# Patient Record
Sex: FEMALE | Race: WHITE | Employment: FULL TIME | URBAN - METROPOLITAN AREA
[De-identification: names, ages, dates, MRNs, and addresses within clinical notes are randomized per-mention and may not be internally consistent; named-entity substitution may affect disease eponyms.]

---

## 2017-01-03 ENCOUNTER — PATIENT MESSAGE (OUTPATIENT)
Dept: FAMILY MEDICINE CLINIC | Facility: CLINIC | Age: 57
End: 2017-01-03

## 2017-01-05 NOTE — TELEPHONE ENCOUNTER
From: Kayley Owens  To: Laura Cadet DO  Sent: 1/3/2017 1:10 PM CST  Subject: Other    I received a notice in the mail about labs test that were ordered for me but I was under the impression that I was waiting to see the doctor on the 23rd of Gennette Folds

## 2017-01-11 ENCOUNTER — HOSPITAL ENCOUNTER (EMERGENCY)
Facility: HOSPITAL | Age: 57
Discharge: STILL ADMITTED OR ADMINISTRATIVE | End: 2017-01-11
Payer: COMMERCIAL

## 2017-01-11 ENCOUNTER — HOSPITAL ENCOUNTER (EMERGENCY)
Facility: HOSPITAL | Age: 57
Discharge: HOME | End: 2017-01-11
Attending: EMERGENCY MEDICINE
Payer: COMMERCIAL

## 2017-01-11 ENCOUNTER — OFFICE VISIT (OUTPATIENT)
Dept: SURGERY | Facility: CLINIC | Age: 57
End: 2017-01-11

## 2017-01-11 VITALS
DIASTOLIC BLOOD PRESSURE: 91 MMHG | HEART RATE: 76 BPM | SYSTOLIC BLOOD PRESSURE: 136 MMHG | TEMPERATURE: 98 F | WEIGHT: 135 LBS | RESPIRATION RATE: 18 BRPM | BODY MASS INDEX: 22 KG/M2 | OXYGEN SATURATION: 100 %

## 2017-01-11 DIAGNOSIS — S01.551A DOG BITE OF SKIN OF LIP, INITIAL ENCOUNTER: Primary | ICD-10-CM
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
## 2017-01-11 DIAGNOSIS — S01.85XA DOG BITE OF FACE, INITIAL ENCOUNTER: Primary | ICD-10-CM
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
## 2017-01-11 PROCEDURE — 99283 EMERGENCY DEPT VISIT LOW MDM: CPT

## 2017-01-11 PROCEDURE — 90471 IMMUNIZATION ADMIN: CPT

## 2017-01-11 PROCEDURE — 12052 INTMD RPR FACE/MM 2.6-5.0 CM: CPT | Performed by: SURGERY

## 2017-01-11 RX ORDER — AMOXICILLIN AND CLAVULANATE POTASSIUM 875; 125 MG/1; MG/1
1 TABLET, FILM COATED ORAL 2 TIMES DAILY
Qty: 14 TABLET | Refills: 0 | Status: SHIPPED | OUTPATIENT
Start: 2017-01-11 | End: 2017-01-11 | Stop reason: ALTCHOICE

## 2017-01-11 NOTE — ED NOTES
Pt and spouse verbalized understanding of need to return to ED at 1530. At that time, pt will need to be triaged again and pt is aware staff will page Dr. Timoteo Payne at that time in order for pt to be seen by him somewhere in the ED.  Pt's spouse voiced concern a

## 2017-01-11 NOTE — ED PROVIDER NOTES
Patient Seen in: [facility] Emergency Department    History   Patient presents with:  Laceration Abrasion (integumentary)  Bite (integumentary)    Stated Complaint: dog bite to the face/ mouth.     HPI    73-year-old female complaining of dog bite th dry and moist red mucosa and is through and through through the orbicularis oris and deep into the oral mucosa. This appears to be a through and through laceration and is quite irregular with jagged edges. There is no active bleeding.     ED Course   Labs

## 2017-01-11 NOTE — ED NOTES
Discussed with pt and family that pt is to return to the ED at 3:30 for follow up with Dr. Aminta Bryant and instruct staff that Dr. Aminta Bryant will need to be paged at that time to see pt in Ed. Charge SILVESTRE Baugh aware of plan and need for arrangements.

## 2017-01-11 NOTE — ED NOTES
Pt not yet ready for discharge. Pt in need of irrigation of wound and placement of bandaid. Discussed with Dr. Jun Acevedo, pt to follow up at 4pm with plastics.

## 2017-01-18 ENCOUNTER — OFFICE VISIT (OUTPATIENT)
Dept: SURGERY | Facility: CLINIC | Age: 57
End: 2017-01-18

## 2017-01-18 VITALS
HEART RATE: 62 BPM | WEIGHT: 139.63 LBS | SYSTOLIC BLOOD PRESSURE: 113 MMHG | BODY MASS INDEX: 22.44 KG/M2 | DIASTOLIC BLOOD PRESSURE: 74 MMHG | HEIGHT: 66 IN | TEMPERATURE: 98 F
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
## 2017-01-18 DIAGNOSIS — S01.551D DOG BITE OF SKIN OF LIP, SUBSEQUENT ENCOUNTER: Primary | ICD-10-CM

## 2017-01-18 PROCEDURE — 99024 POSTOP FOLLOW-UP VISIT: CPT | Performed by: SURGERY

## 2017-01-18 NOTE — PROGRESS NOTES
Isidra Portillo is a 49year old female who presents today for a follow-up after complex lip repair right upper lip after dog bite 1 week ago. She is here today for evaluation and suture removal.      She denies fever and chills.  She denies nausea,

## 2017-01-23 ENCOUNTER — OFFICE VISIT (OUTPATIENT)
Dept: FAMILY MEDICINE CLINIC | Facility: CLINIC | Age: 57
End: 2017-01-23

## 2017-01-23 VITALS
HEIGHT: 66 IN | DIASTOLIC BLOOD PRESSURE: 78 MMHG | BODY MASS INDEX: 22.82 KG/M2 | SYSTOLIC BLOOD PRESSURE: 122 MMHG | TEMPERATURE: 99 F | HEART RATE: 68 BPM | RESPIRATION RATE: 20 BRPM | WEIGHT: 142 LBS

## 2017-01-23 DIAGNOSIS — D64.9 ANEMIA, UNSPECIFIED TYPE: ICD-10-CM

## 2017-01-23 DIAGNOSIS — L60.9 NAIL ABNORMALITY: ICD-10-CM

## 2017-01-23 DIAGNOSIS — M43.6 NECK STIFFNESS: ICD-10-CM

## 2017-01-23 DIAGNOSIS — Z12.31 ENCOUNTER FOR SCREENING MAMMOGRAM FOR HIGH-RISK PATIENT: ICD-10-CM

## 2017-01-23 DIAGNOSIS — Z13.820 SCREENING FOR OSTEOPOROSIS: ICD-10-CM

## 2017-01-23 DIAGNOSIS — M72.2 PLANTAR FASCIITIS, RIGHT: ICD-10-CM

## 2017-01-23 DIAGNOSIS — Z01.419 WELL WOMAN EXAM WITH ROUTINE GYNECOLOGICAL EXAM: Primary | ICD-10-CM

## 2017-01-23 PROCEDURE — 99396 PREV VISIT EST AGE 40-64: CPT | Performed by: FAMILY MEDICINE

## 2017-01-23 NOTE — PROGRESS NOTES
HPI:   Misti Allred is a 49year old female who presents for a complete physical exam.     Wt Readings from Last 6 Encounters:  01/23/17 : 142 lb  01/18/17 : 139 lb 9.6 oz  01/11/17 : 135 lb  11/02/15 : 145 lb  06/09/15 : 140 lb  10/20/14 : 142 lb GENERAL: feels well otherwise  SKIN: denies any unusual skin lesions  EYES:denies blurred vision or double vision  HEENT: denies nasal congestion, sinus pain or ST  LUNGS: denies shortness of breath with exertion  CARDIOVASCULAR: denies chest pain on exe type      5. Nail abnormality    - DERM - INTERNAL    6. Neck stiffness    - XR CERVICAL SPINE (4VIEWS) (CPT=51062); Future    7.  Plantar fasciitis, right  Monitor / shoe inserts         Discussed diet, exercise,calcium, vitamin D, fish oil and self breast

## 2017-01-27 ENCOUNTER — HOSPITAL ENCOUNTER (OUTPATIENT)
Dept: GENERAL RADIOLOGY | Facility: HOSPITAL | Age: 57
Discharge: HOME | End: 2017-01-27
Attending: FAMILY MEDICINE
Payer: COMMERCIAL

## 2017-01-27 DIAGNOSIS — M43.6 NECK STIFFNESS: ICD-10-CM

## 2017-01-27 PROCEDURE — 72050 X-RAY EXAM NECK SPINE 4/5VWS: CPT

## 2017-01-30 ENCOUNTER — APPOINTMENT (OUTPATIENT)
Dept: LAB | Age: 57
End: 2017-01-30
Attending: DERMATOLOGY
Payer: COMMERCIAL

## 2017-01-30 PROCEDURE — 87186 SC STD MICRODIL/AGAR DIL: CPT | Performed by: DERMATOLOGY

## 2017-01-30 PROCEDURE — 87077 CULTURE AEROBIC IDENTIFY: CPT | Performed by: DERMATOLOGY

## 2017-01-30 PROCEDURE — 87070 CULTURE OTHR SPECIMN AEROBIC: CPT | Performed by: DERMATOLOGY

## 2017-01-30 PROCEDURE — 87205 SMEAR GRAM STAIN: CPT | Performed by: DERMATOLOGY

## 2017-02-18 ENCOUNTER — HOSPITAL ENCOUNTER (OUTPATIENT)
Dept: MAMMOGRAPHY | Age: 57
Discharge: HOME | End: 2017-02-18
Attending: FAMILY MEDICINE
Payer: COMMERCIAL

## 2017-02-18 ENCOUNTER — HOSPITAL ENCOUNTER (OUTPATIENT)
Dept: BONE DENSITY | Age: 57
Discharge: HOME | End: 2017-02-18
Attending: FAMILY MEDICINE
Payer: COMMERCIAL

## 2017-02-18 DIAGNOSIS — Z13.820 SCREENING FOR OSTEOPOROSIS: ICD-10-CM

## 2017-02-18 DIAGNOSIS — Z12.31 ENCOUNTER FOR SCREENING MAMMOGRAM FOR HIGH-RISK PATIENT: ICD-10-CM

## 2017-02-18 PROCEDURE — 77063 BREAST TOMOSYNTHESIS BI: CPT

## 2017-02-18 PROCEDURE — 77067 SCR MAMMO BI INCL CAD: CPT

## 2017-02-18 PROCEDURE — 77080 DXA BONE DENSITY AXIAL: CPT

## 2017-03-06 ENCOUNTER — PATIENT MESSAGE (OUTPATIENT)
Dept: FAMILY MEDICINE CLINIC | Facility: CLINIC | Age: 57
End: 2017-03-06

## 2017-03-06 NOTE — TELEPHONE ENCOUNTER
From: Scar Espino  To: Qi Small DO  Sent: 3/6/2017 9:05 AM CST  Subject: Non-Urgent Medical Question    I had my flu shot at [facility] back in October. Could you please update my chart? Thanks. Have a great day.     Quique Khalil

## 2018-02-11 NOTE — PROGRESS NOTES
HPI:   Miguel Holt is a 56year old female who presents for a complete physical exam.     Wt Readings from Last 6 Encounters:  02/12/18 : 146 lb  01/23/17 : 142 lb  01/18/17 : 139 lb 9.6 oz  01/11/17 : 135 lb  11/02/15 : 145 lb  06/09/15 : 140 lb Smokeless tobacco: Never Used                      Alcohol use: No              Occ: . : . Children: . Works in perinatology for [employer]  Exercise: 7 times per week.   Diet: watches calories closely     REVIEW OF SYSTEMS:   GENERAL: feels well othe T4 (FREE THYROXINE); Future  - ASSAY, THYROID STIM HORMONE; Future  - LIPID PANEL; Future  - CBC WITH DIFFERENTIAL WITH PLATELET; Future  - VITAMIN B12; Future  - VITAMIN D, 25-HYDROXY; Future  - COMP METABOLIC PANEL (14); Future  - IRON AND TIBC;  Future

## 2018-02-12 ENCOUNTER — OFFICE VISIT (OUTPATIENT)
Dept: FAMILY MEDICINE CLINIC | Facility: CLINIC | Age: 58
End: 2018-02-12

## 2018-02-12 VITALS
DIASTOLIC BLOOD PRESSURE: 80 MMHG | TEMPERATURE: 99 F | WEIGHT: 146 LBS | OXYGEN SATURATION: 99 % | HEART RATE: 72 BPM | BODY MASS INDEX: 23.46 KG/M2 | HEIGHT: 66 IN | RESPIRATION RATE: 20 BRPM | SYSTOLIC BLOOD PRESSURE: 120 MMHG

## 2018-02-12 DIAGNOSIS — Z12.31 ENCOUNTER FOR SCREENING MAMMOGRAM FOR HIGH-RISK PATIENT: ICD-10-CM

## 2018-02-12 DIAGNOSIS — Z01.419 WELL WOMAN EXAM WITH ROUTINE GYNECOLOGICAL EXAM: Primary | ICD-10-CM

## 2018-02-12 DIAGNOSIS — Z00.00 ANNUAL PHYSICAL EXAM: ICD-10-CM

## 2018-02-12 PROBLEM — S01.551A DOG BITE OF SKIN OF LIP: Status: RESOLVED | Noted: 2017-01-11 | Resolved: 2018-02-12
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
## 2018-02-12 PROCEDURE — 87624 HPV HI-RISK TYP POOLED RSLT: CPT | Performed by: FAMILY MEDICINE

## 2018-02-12 PROCEDURE — 88175 CYTOPATH C/V AUTO FLUID REDO: CPT | Performed by: FAMILY MEDICINE

## 2018-02-12 PROCEDURE — 99396 PREV VISIT EST AGE 40-64: CPT | Performed by: FAMILY MEDICINE

## 2018-02-13 LAB — HPV I/H RISK 1 DNA SPEC QL NAA+PROBE: NEGATIVE

## 2018-02-14 LAB
LAST PAP RESULT: NORMAL
PAP HISTORY (OTHER THAN LAST PAP): NORMAL

## 2018-02-19 ENCOUNTER — LAB ENCOUNTER (OUTPATIENT)
Dept: LAB | Facility: HOSPITAL | Age: 58
End: 2018-02-19
Attending: FAMILY MEDICINE
Payer: COMMERCIAL

## 2018-02-19 DIAGNOSIS — Z00.00 ANNUAL PHYSICAL EXAM: ICD-10-CM

## 2018-02-19 LAB
25-HYDROXYVITAMIN D (TOTAL): 56.7 NG/ML (ref 30–100)
ALBUMIN SERPL-MCNC: 4.1 G/DL (ref 3.5–4.8)
ALP LIVER SERPL-CCNC: 86 U/L (ref 41–108)
ALT SERPL-CCNC: 25 U/L (ref 14–54)
AST SERPL-CCNC: 29 U/L (ref 15–41)
BASOPHILS # BLD AUTO: 0.04 X10(3) UL (ref 0–0.1)
BASOPHILS NFR BLD AUTO: 0.8 %
BILIRUB SERPL-MCNC: 0.5 MG/DL (ref 0.1–2)
BUN BLD-MCNC: 15 MG/DL (ref 8–20)
CALCIUM BLD-MCNC: 9.3 MG/DL (ref 8.3–10.3)
CHLORIDE: 106 MMOL/L (ref 101–111)
CHOLEST SMN-MCNC: 214 MG/DL (ref ?–200)
CO2: 29 MMOL/L (ref 22–32)
CREAT BLD-MCNC: 0.67 MG/DL (ref 0.55–1.02)
DEPRECATED HBV CORE AB SER IA-ACNC: 34.8 NG/ML (ref 10–291)
EOSINOPHIL # BLD AUTO: 0.12 X10(3) UL (ref 0–0.3)
EOSINOPHIL NFR BLD AUTO: 2.4 %
ERYTHROCYTE [DISTWIDTH] IN BLOOD BY AUTOMATED COUNT: 11.4 % (ref 11.5–16)
FREE T4: 1 NG/DL (ref 0.9–1.8)
GLUCOSE BLD-MCNC: 96 MG/DL (ref 70–99)
HAV AB SERPL IA-ACNC: 799 PG/ML (ref 193–986)
HCT VFR BLD AUTO: 41.3 % (ref 34–50)
HDLC SERPL-MCNC: 111 MG/DL (ref 45–?)
HDLC SERPL: 1.93 {RATIO} (ref ?–4.44)
HGB BLD-MCNC: 13.9 G/DL (ref 12–16)
IMMATURE GRANULOCYTE COUNT: 0.01 X10(3) UL (ref 0–1)
IMMATURE GRANULOCYTE RATIO %: 0.2 %
IRON SATURATION: 46 % (ref 13–45)
IRON: 175 UG/DL (ref 28–170)
LDLC SERPL CALC-MCNC: 91 MG/DL (ref ?–130)
LYMPHOCYTES # BLD AUTO: 1.59 X10(3) UL (ref 0.9–4)
LYMPHOCYTES NFR BLD AUTO: 31.4 %
M PROTEIN MFR SERPL ELPH: 7.5 G/DL (ref 6.1–8.3)
MCH RBC QN AUTO: 32.3 PG (ref 27–33.2)
MCHC RBC AUTO-ENTMCNC: 33.7 G/DL (ref 31–37)
MCV RBC AUTO: 95.8 FL (ref 81–100)
MONOCYTES # BLD AUTO: 0.51 X10(3) UL (ref 0.1–1)
MONOCYTES NFR BLD AUTO: 10.1 %
NEUTROPHIL ABS PRELIM: 2.8 X10 (3) UL (ref 1.3–6.7)
NEUTROPHILS # BLD AUTO: 2.8 X10(3) UL (ref 1.3–6.7)
NEUTROPHILS NFR BLD AUTO: 55.1 %
NONHDLC SERPL-MCNC: 103 MG/DL (ref ?–130)
PLATELET # BLD AUTO: 194 10(3)UL (ref 150–450)
POTASSIUM SERPL-SCNC: 3.9 MMOL/L (ref 3.6–5.1)
RBC # BLD AUTO: 4.31 X10(6)UL (ref 3.8–5.1)
RED CELL DISTRIBUTION WIDTH-SD: 40.2 FL (ref 35.1–46.3)
SODIUM SERPL-SCNC: 140 MMOL/L (ref 136–144)
TOTAL IRON BINDING CAPACITY: 377 UG/DL (ref 298–536)
TRANSFERRIN: 253 MG/DL (ref 200–360)
TRIGL SERPL-MCNC: 61 MG/DL (ref ?–150)
TSI SER-ACNC: 1.5 MIU/ML (ref 0.35–5.5)
VLDLC SERPL CALC-MCNC: 12 MG/DL (ref 5–40)
WBC # BLD AUTO: 5.1 X10(3) UL (ref 4–13)

## 2018-02-19 PROCEDURE — 83540 ASSAY OF IRON: CPT

## 2018-02-19 PROCEDURE — 82607 VITAMIN B-12: CPT

## 2018-02-19 PROCEDURE — 82728 ASSAY OF FERRITIN: CPT

## 2018-02-19 PROCEDURE — 83550 IRON BINDING TEST: CPT

## 2018-02-19 PROCEDURE — 85025 COMPLETE CBC W/AUTO DIFF WBC: CPT

## 2018-02-19 PROCEDURE — 36415 COLL VENOUS BLD VENIPUNCTURE: CPT

## 2018-02-19 PROCEDURE — 84443 ASSAY THYROID STIM HORMONE: CPT

## 2018-02-19 PROCEDURE — 80061 LIPID PANEL: CPT

## 2018-02-19 PROCEDURE — 82306 VITAMIN D 25 HYDROXY: CPT

## 2018-02-19 PROCEDURE — 80053 COMPREHEN METABOLIC PANEL: CPT

## 2018-02-19 PROCEDURE — 84439 ASSAY OF FREE THYROXINE: CPT

## 2018-02-23 ENCOUNTER — TELEPHONE (OUTPATIENT)
Dept: FAMILY MEDICINE CLINIC | Facility: CLINIC | Age: 58
End: 2018-02-23

## 2018-02-23 DIAGNOSIS — D64.9 ANEMIA, UNSPECIFIED TYPE: Primary | ICD-10-CM

## 2018-02-23 NOTE — TELEPHONE ENCOUNTER
----- Message from Bo Allen DO sent at 2/22/2018  4:30 PM CST -----  Have her take every other day ; repeat cbc and iron studies in 4 mo

## 2018-03-13 ENCOUNTER — HOSPITAL ENCOUNTER (OUTPATIENT)
Dept: MAMMOGRAPHY | Facility: HOSPITAL | Age: 58
Discharge: HOME | End: 2018-03-13
Attending: FAMILY MEDICINE
Payer: COMMERCIAL

## 2018-03-13 DIAGNOSIS — Z12.31 ENCOUNTER FOR SCREENING MAMMOGRAM FOR HIGH-RISK PATIENT: ICD-10-CM

## 2018-03-13 PROCEDURE — 77067 SCR MAMMO BI INCL CAD: CPT | Performed by: FAMILY MEDICINE

## 2018-03-13 PROCEDURE — 77063 BREAST TOMOSYNTHESIS BI: CPT | Performed by: FAMILY MEDICINE

## 2018-03-19 ENCOUNTER — PATIENT MESSAGE (OUTPATIENT)
Dept: FAMILY MEDICINE CLINIC | Facility: CLINIC | Age: 58
End: 2018-03-19

## 2018-03-19 NOTE — TELEPHONE ENCOUNTER
Hi Dr. Raul Jennings,    I had previous gone to Dr. Candace Lizarraga for a bacterial infection under my finger nail.  She prescribed Levofloxacin 500 mg , the infection went away but now has returned.  I was wondering if you could order this medication for me?     María Hancock

## 2018-03-19 NOTE — TELEPHONE ENCOUNTER
From: Julia Pineda  To: Zora Gardiner DO  Sent: 3/19/2018 1:41 PM CDT  Subject: Non-Urgent Erin Clarity Dr. Brunilda Green,    I had previous gone to Dr. Dianna Elena for a bacterial infection under my finger nail.  She prescribed Levofloxacin 5

## 2018-03-20 ENCOUNTER — PATIENT MESSAGE (OUTPATIENT)
Dept: FAMILY MEDICINE CLINIC | Facility: CLINIC | Age: 58
End: 2018-03-20

## 2018-03-20 NOTE — TELEPHONE ENCOUNTER
From: Talisha Benitez  To: Marily Odom DO  Sent: 3/20/2018 2:08 PM CDT  Subject: Non-Urgent Medical Question    Hi Dr. Stacey Vega,    I would like to see the dermatologist Dr. Amie Santizo for the bacterial infection on my nail.   It has come back o

## 2018-03-20 NOTE — TELEPHONE ENCOUNTER
I think it is best she is seen by a provider   Levaquin wound not be my first choice. ..  So an appt is best

## 2018-05-26 ENCOUNTER — HOSPITAL ENCOUNTER (OUTPATIENT)
Age: 58
Discharge: HOME | End: 2018-05-26
Attending: FAMILY MEDICINE
Payer: COMMERCIAL

## 2018-05-26 VITALS
RESPIRATION RATE: 20 BRPM | TEMPERATURE: 98 F | SYSTOLIC BLOOD PRESSURE: 129 MMHG | OXYGEN SATURATION: 100 % | HEART RATE: 62 BPM | DIASTOLIC BLOOD PRESSURE: 78 MMHG

## 2018-05-26 DIAGNOSIS — S05.91XA RIGHT EYE INJURY, INITIAL ENCOUNTER: Primary | ICD-10-CM

## 2018-05-26 PROCEDURE — 99213 OFFICE O/P EST LOW 20 MIN: CPT

## 2018-05-26 PROCEDURE — 99203 OFFICE O/P NEW LOW 30 MIN: CPT

## 2018-05-26 RX ORDER — POLYMYXIN B SULFATE AND TRIMETHOPRIM 1; 10000 MG/ML; [USP'U]/ML
1 SOLUTION OPHTHALMIC
Qty: 10 ML | Refills: 0 | Status: SHIPPED | OUTPATIENT
Start: 2018-05-26 | End: 2018-05-31

## 2018-05-26 RX ORDER — TETRACAINE HYDROCHLORIDE 5 MG/ML
1 SOLUTION OPHTHALMIC ONCE
Status: COMPLETED | OUTPATIENT
Start: 2018-05-26 | End: 2018-05-26

## 2018-05-26 NOTE — ED PROVIDER NOTES
Patient Seen in: [facility]    History   Patient presents with:  Eye Problem    Stated Complaint: RIGHT EYE INJURY    HPI    22-year-old female was working in her garden on Wednesday.   She was unwinding a wire tie and the end of Drops of Tetracaine placed in R eye. After pt comfortable, fluorescein strip placed in eye. Small lesion noted at the right conjunctiva under Woods lamp. Pt tolerated the procedure well. MDM   Questionable contusion versus puncture wound of the eye.   Isaias Pastrana

## 2018-05-26 NOTE — ED INITIAL ASSESSMENT (HPI)
Wed a wire hit pt in outer edge of right eye. States had no pain then or next day. Minimal redness. Now feels bruised when she blinks and redness is more pronounced.   Visual acuity 20/40 right and 20/30 left with glasses

## 2018-06-25 ENCOUNTER — LAB ENCOUNTER (OUTPATIENT)
Dept: LAB | Age: 58
End: 2018-06-25
Attending: FAMILY MEDICINE
Payer: COMMERCIAL

## 2018-06-25 DIAGNOSIS — D64.9 ANEMIA, UNSPECIFIED TYPE: ICD-10-CM

## 2018-06-25 PROCEDURE — 85025 COMPLETE CBC W/AUTO DIFF WBC: CPT

## 2018-06-25 PROCEDURE — 82728 ASSAY OF FERRITIN: CPT

## 2018-06-25 PROCEDURE — 83540 ASSAY OF IRON: CPT

## 2018-06-25 PROCEDURE — 36415 COLL VENOUS BLD VENIPUNCTURE: CPT

## 2018-06-25 PROCEDURE — 83550 IRON BINDING TEST: CPT

## 2019-02-18 ENCOUNTER — OFFICE VISIT (OUTPATIENT)
Dept: FAMILY MEDICINE CLINIC | Facility: CLINIC | Age: 59
End: 2019-02-18
Payer: COMMERCIAL

## 2019-02-18 VITALS
SYSTOLIC BLOOD PRESSURE: 108 MMHG | OXYGEN SATURATION: 98 % | TEMPERATURE: 98 F | HEIGHT: 66 IN | DIASTOLIC BLOOD PRESSURE: 64 MMHG | RESPIRATION RATE: 18 BRPM | WEIGHT: 141.38 LBS | HEART RATE: 61 BPM | BODY MASS INDEX: 22.72 KG/M2

## 2019-02-18 DIAGNOSIS — Z01.419 WELL WOMAN EXAM WITH ROUTINE GYNECOLOGICAL EXAM: Primary | ICD-10-CM

## 2019-02-18 DIAGNOSIS — Z12.31 ENCOUNTER FOR SCREENING MAMMOGRAM FOR HIGH-RISK PATIENT: ICD-10-CM

## 2019-02-18 DIAGNOSIS — Z13.820 SPECIAL SCREENING FOR OSTEOPOROSIS: ICD-10-CM

## 2019-02-18 DIAGNOSIS — Z00.00 ANNUAL PHYSICAL EXAM: ICD-10-CM

## 2019-02-18 PROCEDURE — 99396 PREV VISIT EST AGE 40-64: CPT | Performed by: FAMILY MEDICINE

## 2019-02-18 NOTE — PROGRESS NOTES
HPI:   Victorina Wilkerson is a 66year old female who presents for a complete physical exam.     Wt Readings from Last 6 Encounters:  02/18/19 : 141 lb 6 oz  02/12/18 : 146 lb  01/23/17 : 142 lb  01/18/17 : 139 lb 9.6 oz  01/11/17 : 135 lb  11/02/15 : 1 Used    Alcohol use: No    Drug use: No    Occ: . : . Children: . 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   Diet: watches calories closely     REVIEW OF SYSTEMS:   GENERAL: feels well otherwise  SKIN: denies any unusual skin lesi ASSAY, THYROID STIM HORMONE; Future  - LIPID PANEL; Future  - CBC WITH DIFFERENTIAL WITH PLATELET; Future  - VITAMIN B12; Future  - VITAMIN D, 25-HYDROXY; Future  - COMP METABOLIC PANEL (14); Future    3.  Encounter for screening mammogram for high-risk pat

## 2019-02-22 ENCOUNTER — LAB ENCOUNTER (OUTPATIENT)
Dept: LAB | Age: 59
End: 2019-02-22
Attending: FAMILY MEDICINE
Payer: COMMERCIAL

## 2019-02-22 DIAGNOSIS — Z00.00 ANNUAL PHYSICAL EXAM: ICD-10-CM

## 2019-02-22 LAB
ALBUMIN SERPL-MCNC: 4.3 G/DL (ref 3.4–5)
ALBUMIN/GLOB SERPL: 1.4 {RATIO} (ref 1–2)
ALP LIVER SERPL-CCNC: 67 U/L (ref 46–118)
ALT SERPL-CCNC: 27 U/L (ref 13–56)
ANION GAP SERPL CALC-SCNC: 7 MMOL/L (ref 0–18)
AST SERPL-CCNC: 31 U/L (ref 15–37)
BASOPHILS # BLD AUTO: 0.04 X10(3) UL (ref 0–0.2)
BASOPHILS NFR BLD AUTO: 0.7 %
BILIRUB SERPL-MCNC: 0.6 MG/DL (ref 0.1–2)
BUN BLD-MCNC: 13 MG/DL (ref 7–18)
BUN/CREAT SERPL: 18.3 (ref 10–20)
CALCIUM BLD-MCNC: 8.8 MG/DL (ref 8.5–10.1)
CHLORIDE SERPL-SCNC: 104 MMOL/L (ref 98–107)
CHOLEST SMN-MCNC: 221 MG/DL (ref ?–200)
CO2 SERPL-SCNC: 28 MMOL/L (ref 21–32)
CREAT BLD-MCNC: 0.71 MG/DL (ref 0.55–1.02)
DEPRECATED RDW RBC AUTO: 41.5 FL (ref 35.1–46.3)
EOSINOPHIL # BLD AUTO: 0.07 X10(3) UL (ref 0–0.7)
EOSINOPHIL NFR BLD AUTO: 1.2 %
ERYTHROCYTE [DISTWIDTH] IN BLOOD BY AUTOMATED COUNT: 12 % (ref 11–15)
GLOBULIN PLAS-MCNC: 3.1 G/DL (ref 2.8–4.4)
GLUCOSE BLD-MCNC: 88 MG/DL (ref 70–99)
HCT VFR BLD AUTO: 39.8 % (ref 35–48)
HDLC SERPL-MCNC: 106 MG/DL (ref 40–59)
HGB BLD-MCNC: 13.6 G/DL (ref 12–16)
IMM GRANULOCYTES # BLD AUTO: 0.02 X10(3) UL (ref 0–1)
IMM GRANULOCYTES NFR BLD: 0.3 %
LDLC SERPL CALC-MCNC: 103 MG/DL (ref ?–100)
LYMPHOCYTES # BLD AUTO: 1.34 X10(3) UL (ref 1–4)
LYMPHOCYTES NFR BLD AUTO: 22.5 %
M PROTEIN MFR SERPL ELPH: 7.4 G/DL (ref 6.4–8.2)
MCH RBC QN AUTO: 32.4 PG (ref 26–34)
MCHC RBC AUTO-ENTMCNC: 34.2 G/DL (ref 31–37)
MCV RBC AUTO: 94.8 FL (ref 80–100)
MONOCYTES # BLD AUTO: 0.5 X10(3) UL (ref 0.1–1)
MONOCYTES NFR BLD AUTO: 8.4 %
NEUTROPHILS # BLD AUTO: 3.98 X10 (3) UL (ref 1.5–7.7)
NEUTROPHILS # BLD AUTO: 3.98 X10(3) UL (ref 1.5–7.7)
NEUTROPHILS NFR BLD AUTO: 66.9 %
NONHDLC SERPL-MCNC: 115 MG/DL (ref ?–130)
OSMOLALITY SERPL CALC.SUM OF ELEC: 288 MOSM/KG (ref 275–295)
PLATELET # BLD AUTO: 182 10(3)UL (ref 150–450)
POTASSIUM SERPL-SCNC: 4.3 MMOL/L (ref 3.5–5.1)
RBC # BLD AUTO: 4.2 X10(6)UL (ref 3.8–5.3)
SODIUM SERPL-SCNC: 139 MMOL/L (ref 136–145)
T4 FREE SERPL-MCNC: 1.1 NG/DL (ref 0.8–1.7)
TRIGL SERPL-MCNC: 60 MG/DL (ref 30–149)
TSI SER-ACNC: 1.4 MIU/ML (ref 0.36–3.74)
VIT B12 SERPL-MCNC: 633 PG/ML (ref 193–986)
VIT D+METAB SERPL-MCNC: 55 NG/ML (ref 30–100)
VLDLC SERPL CALC-MCNC: 12 MG/DL (ref 0–30)
WBC # BLD AUTO: 6 X10(3) UL (ref 4–11)

## 2019-02-22 PROCEDURE — 80061 LIPID PANEL: CPT

## 2019-02-22 PROCEDURE — 36415 COLL VENOUS BLD VENIPUNCTURE: CPT

## 2019-02-22 PROCEDURE — 82306 VITAMIN D 25 HYDROXY: CPT

## 2019-02-22 PROCEDURE — 80053 COMPREHEN METABOLIC PANEL: CPT

## 2019-02-22 PROCEDURE — 84439 ASSAY OF FREE THYROXINE: CPT

## 2019-02-22 PROCEDURE — 84443 ASSAY THYROID STIM HORMONE: CPT

## 2019-02-22 PROCEDURE — 82607 VITAMIN B-12: CPT

## 2019-02-22 PROCEDURE — 85025 COMPLETE CBC W/AUTO DIFF WBC: CPT

## 2019-03-19 ENCOUNTER — HOSPITAL ENCOUNTER (OUTPATIENT)
Dept: MAMMOGRAPHY | Facility: HOSPITAL | Age: 59
Discharge: HOME | End: 2019-03-19
Attending: FAMILY MEDICINE
Payer: COMMERCIAL

## 2019-03-19 DIAGNOSIS — Z12.31 ENCOUNTER FOR SCREENING MAMMOGRAM FOR HIGH-RISK PATIENT: ICD-10-CM

## 2019-03-19 PROCEDURE — 77063 BREAST TOMOSYNTHESIS BI: CPT | Performed by: FAMILY MEDICINE

## 2019-03-19 PROCEDURE — 77067 SCR MAMMO BI INCL CAD: CPT | Performed by: FAMILY MEDICINE

## 2019-03-29 ENCOUNTER — HOSPITAL ENCOUNTER (OUTPATIENT)
Dept: BONE DENSITY | Age: 59
Discharge: HOME | End: 2019-03-29
Attending: FAMILY MEDICINE
Payer: COMMERCIAL

## 2019-03-29 DIAGNOSIS — Z13.820 SPECIAL SCREENING FOR OSTEOPOROSIS: ICD-10-CM

## 2019-03-29 PROCEDURE — 77080 DXA BONE DENSITY AXIAL: CPT | Performed by: FAMILY MEDICINE

## 2019-04-01 ENCOUNTER — PATIENT MESSAGE (OUTPATIENT)
Dept: FAMILY MEDICINE CLINIC | Facility: CLINIC | Age: 59
End: 2019-04-01

## 2019-04-01 NOTE — TELEPHONE ENCOUNTER
From: Julia Pineda  To: Zora Gardiner DO  Sent: 4/1/2019 9:38 AM CDT  Subject: Test Results Question    My bone density results show osteoporosis.  I compared my results from the last one and notice that I should have been taking calcium supplemen

## 2019-04-02 ENCOUNTER — OFFICE VISIT (OUTPATIENT)
Dept: FAMILY MEDICINE CLINIC | Facility: CLINIC | Age: 59
End: 2019-04-02
Payer: COMMERCIAL

## 2019-04-02 VITALS
OXYGEN SATURATION: 98 % | HEART RATE: 63 BPM | HEIGHT: 66 IN | SYSTOLIC BLOOD PRESSURE: 106 MMHG | DIASTOLIC BLOOD PRESSURE: 72 MMHG | BODY MASS INDEX: 21.69 KG/M2 | RESPIRATION RATE: 16 BRPM | TEMPERATURE: 98 F | WEIGHT: 135 LBS

## 2019-04-02 DIAGNOSIS — M81.8 OTHER OSTEOPOROSIS WITHOUT CURRENT PATHOLOGICAL FRACTURE: Primary | ICD-10-CM

## 2019-04-02 PROCEDURE — 99213 OFFICE O/P EST LOW 20 MIN: CPT | Performed by: FAMILY MEDICINE

## 2019-04-03 ENCOUNTER — APPOINTMENT (OUTPATIENT)
Dept: LAB | Age: 59
End: 2019-04-03
Attending: FAMILY MEDICINE
Payer: COMMERCIAL

## 2019-04-03 DIAGNOSIS — M81.8 OTHER OSTEOPOROSIS WITHOUT CURRENT PATHOLOGICAL FRACTURE: ICD-10-CM

## 2019-04-03 PROCEDURE — 36415 COLL VENOUS BLD VENIPUNCTURE: CPT

## 2019-04-03 PROCEDURE — 83970 ASSAY OF PARATHORMONE: CPT

## 2019-04-03 NOTE — PROGRESS NOTES
HPI:   Hailee Ohara is a 66year old female who presents for dexa follow up    Xr Dexa Bone Densitometry (cpt=77080)    Result Date: 3/29/2019  CONCLUSION:  1.  Bone mineral density values are compatible with the diagnosis of osteoporosis by WHO de denies hx of anemia  ENDOCRINE: denies thyroid history  ALL/ASTHMA: denies hx of allergy or asthma    EXAM:   /72   Pulse 63   Temp 97.6 °F (36.4 °C) (Oral)   Resp 16   Ht 66\"   Wt 135 lb   SpO2 98%   BMI 21.79 kg/m²   Body mass index is 21.79 kg/m²

## 2019-11-15 ENCOUNTER — OFFICE VISIT (OUTPATIENT)
Dept: FAMILY MEDICINE CLINIC | Facility: CLINIC | Age: 59
End: 2019-11-15
Payer: COMMERCIAL

## 2019-11-15 VITALS
HEART RATE: 75 BPM | HEIGHT: 65.25 IN | WEIGHT: 136 LBS | DIASTOLIC BLOOD PRESSURE: 64 MMHG | BODY MASS INDEX: 22.39 KG/M2 | SYSTOLIC BLOOD PRESSURE: 110 MMHG | TEMPERATURE: 99 F | OXYGEN SATURATION: 98 % | RESPIRATION RATE: 16 BRPM

## 2019-11-15 DIAGNOSIS — M65.30 TRIGGER FINGER OF RIGHT HAND, UNSPECIFIED FINGER: ICD-10-CM

## 2019-11-15 DIAGNOSIS — M79.644 PAIN OF FINGER OF RIGHT HAND: Primary | ICD-10-CM

## 2019-11-15 PROCEDURE — 99213 OFFICE O/P EST LOW 20 MIN: CPT | Performed by: FAMILY MEDICINE

## 2019-11-15 NOTE — PROGRESS NOTES
HPI:   Denise Francisco is a 64year old female who presents with right middle finger pain     Pt hit hand one week ago   Pain at right middle MCP joint   + locking for 4 months   + finger disfigurement for several months / no injury     No current ou finger of right hand    - XR FINGER(S) (MIN 2 VIEWS), RIGHT 3RD (CPT=73140); Future  - ORTHOPEDIC - INTERNAL    2.  Trigger finger of right hand, unspecified finger    - ORTHOPEDIC - INTERNAL      Questions answered and patient indicates understanding of th

## 2019-11-18 ENCOUNTER — HOSPITAL ENCOUNTER (OUTPATIENT)
Dept: GENERAL RADIOLOGY | Facility: HOSPITAL | Age: 59
Discharge: HOME | End: 2019-11-18
Attending: FAMILY MEDICINE
Payer: COMMERCIAL

## 2019-11-18 DIAGNOSIS — M79.644 PAIN OF FINGER OF RIGHT HAND: ICD-10-CM

## 2019-11-18 PROCEDURE — 73140 X-RAY EXAM OF FINGER(S): CPT | Performed by: FAMILY MEDICINE

## 2019-11-25 PROBLEM — M65.331 TRIGGER MIDDLE FINGER OF RIGHT HAND: Status: ACTIVE | Noted: 2019-11-25

## 2020-01-07 ENCOUNTER — OFFICE VISIT (OUTPATIENT)
Dept: RHEUMATOLOGY | Facility: CLINIC | Age: 60
End: 2020-01-07
Payer: COMMERCIAL

## 2020-01-07 VITALS
SYSTOLIC BLOOD PRESSURE: 110 MMHG | HEIGHT: 66 IN | WEIGHT: 139.19 LBS | DIASTOLIC BLOOD PRESSURE: 58 MMHG | HEART RATE: 65 BPM | BODY MASS INDEX: 22.37 KG/M2 | OXYGEN SATURATION: 99 %

## 2020-01-07 DIAGNOSIS — M25.60 MORNING JOINT STIFFNESS: ICD-10-CM

## 2020-01-07 DIAGNOSIS — M11.80 CALCIUM PYROPHOSPHATE ARTHROPATHY: ICD-10-CM

## 2020-01-07 DIAGNOSIS — M79.641 BILATERAL HAND PAIN: Primary | ICD-10-CM

## 2020-01-07 DIAGNOSIS — M79.642 BILATERAL HAND PAIN: Primary | ICD-10-CM

## 2020-01-07 PROCEDURE — 99214 OFFICE O/P EST MOD 30 MIN: CPT | Performed by: INTERNAL MEDICINE

## 2020-01-07 NOTE — PROGRESS NOTES
?  RHEUMATOLOGY NEW PATIENT   Date of visit: 1/7/2020  ? Patient presents with:  New Patient: New pt in for Arthiritis in the hands C/O joint swelling and stiffness on bilateral hands. Worse in the morning, not taking any medication.   X-ray of hand done 1 following active problems who is referred for rheumatologic evaluation due to bilateral hand pain/discomfort. Has been suffering from bilateral hand pain for several years. Has noticed some deviations of the fingers which is cause of concern for her. no symptoms of severe dry eyes, dry mouth, or swelling of the cheeks or under the jawbone. No fevers, chills, lymphadenopathy, night sweats, unexpected weight loss, or easy bruising or bleeding. Denies chronic sinus infections/disease or epistaxis.   Maria Eugenia Alejandre and headaches. Endo/Heme/Allergies: Does not bruise/bleed easily. Psychiatric/Behavioral: Negative for depression. The patient is not nervous/anxious and does not have insomnia.       PHYSICAL EXAM   Today's Vitals:  Temperature Blood Pressure Heart Rat time. No cranial nerve deficit. Skin: Skin is warm and dry. No rash noted. She is not diaphoretic. No erythema. No malar rash  No periungal erythema  No nail pitting  No onycholysis    Psychiatric: She has a normal mood and affect.  Her behavior is norm density (BMD) (g/cm2):  0.636      Femoral neck T-Score:  -1.9      % young normals:  75      % age matched controls:  80      Change from prior hip examination:  -1.4%            ADDITIONAL FINDINGS:  No significant additional findings.       =====  CONCL Additional Labs:    Lala Loaiza, DO  [facility]  1/7/2020

## 2020-02-29 NOTE — PROGRESS NOTES
HPI:   Isidra Portillo is a 64year old female who presents for a complete physical exam.     Wt Readings from Last 6 Encounters:  03/02/20 : 144 lb (65.3 kg)  01/07/20 : 139 lb 3.2 oz (63.1 kg)  11/25/19 : 136 lb (61.7 kg)  11/15/19 : 136 lb (61.7 k 48        In her 54's. Social History:   Social History    Tobacco Use      Smoking status: Never Smoker      Smokeless tobacco: Never Used    Alcohol use: No    Drug use: No    Occ: . : . Children: .    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 Well woman exam with routine gynecological exam    - FREE T4 (FREE THYROXINE); Future  - ASSAY, THYROID STIM HORMONE; Future  - LIPID PANEL; Future  - CBC WITH DIFFERENTIAL WITH PLATELET;  Future  - VITAMIN B12; Futu

## 2020-03-02 ENCOUNTER — OFFICE VISIT (OUTPATIENT)
Dept: FAMILY MEDICINE CLINIC | Facility: CLINIC | Age: 60
End: 2020-03-02
Payer: COMMERCIAL

## 2020-03-02 VITALS
OXYGEN SATURATION: 98 % | WEIGHT: 144 LBS | RESPIRATION RATE: 18 BRPM | DIASTOLIC BLOOD PRESSURE: 72 MMHG | SYSTOLIC BLOOD PRESSURE: 118 MMHG | HEART RATE: 80 BPM | TEMPERATURE: 98 F | BODY MASS INDEX: 23.14 KG/M2 | HEIGHT: 66 IN

## 2020-03-02 DIAGNOSIS — Z13.820 SCREENING FOR OSTEOPOROSIS: ICD-10-CM

## 2020-03-02 DIAGNOSIS — Z12.31 ENCOUNTER FOR SCREENING MAMMOGRAM FOR HIGH-RISK PATIENT: ICD-10-CM

## 2020-03-02 DIAGNOSIS — Z01.419 WELL WOMAN EXAM WITH ROUTINE GYNECOLOGICAL EXAM: Primary | ICD-10-CM

## 2020-03-02 PROCEDURE — 99396 PREV VISIT EST AGE 40-64: CPT | Performed by: FAMILY MEDICINE

## 2020-03-06 ENCOUNTER — LAB ENCOUNTER (OUTPATIENT)
Dept: LAB | Age: 60
End: 2020-03-06
Attending: FAMILY MEDICINE
Payer: COMMERCIAL

## 2020-03-06 DIAGNOSIS — M79.641 BILATERAL HAND PAIN: ICD-10-CM

## 2020-03-06 DIAGNOSIS — M11.80 CALCIUM PYROPHOSPHATE ARTHROPATHY: ICD-10-CM

## 2020-03-06 DIAGNOSIS — M79.642 BILATERAL HAND PAIN: ICD-10-CM

## 2020-03-06 DIAGNOSIS — M25.60 MORNING JOINT STIFFNESS: ICD-10-CM

## 2020-03-06 DIAGNOSIS — Z01.419 WELL WOMAN EXAM WITH ROUTINE GYNECOLOGICAL EXAM: ICD-10-CM

## 2020-03-06 LAB
ALBUMIN SERPL-MCNC: 4 G/DL (ref 3.4–5)
ALBUMIN/GLOB SERPL: 1.2 {RATIO} (ref 1–2)
ALP LIVER SERPL-CCNC: 72 U/L (ref 46–118)
ALT SERPL-CCNC: 32 U/L (ref 13–56)
ANION GAP SERPL CALC-SCNC: 5 MMOL/L (ref 0–18)
AST SERPL-CCNC: 34 U/L (ref 15–37)
BASOPHILS # BLD AUTO: 0.05 X10(3) UL (ref 0–0.2)
BASOPHILS NFR BLD AUTO: 0.7 %
BILIRUB SERPL-MCNC: 0.7 MG/DL (ref 0.1–2)
BUN BLD-MCNC: 24 MG/DL (ref 7–18)
BUN/CREAT SERPL: 27.9 (ref 10–20)
CALCIUM BLD-MCNC: 9.1 MG/DL (ref 8.5–10.1)
CHLORIDE SERPL-SCNC: 101 MMOL/L (ref 98–112)
CHOLEST SMN-MCNC: 197 MG/DL (ref ?–200)
CO2 SERPL-SCNC: 28 MMOL/L (ref 21–32)
CREAT BLD-MCNC: 0.86 MG/DL (ref 0.55–1.02)
CRP SERPL-MCNC: <0.29 MG/DL (ref ?–0.3)
DEPRECATED HBV CORE AB SER IA-ACNC: 31.6 NG/ML (ref 18–340)
DEPRECATED RDW RBC AUTO: 40.6 FL (ref 35.1–46.3)
EOSINOPHIL # BLD AUTO: 0.11 X10(3) UL (ref 0–0.7)
EOSINOPHIL NFR BLD AUTO: 1.6 %
ERYTHROCYTE [DISTWIDTH] IN BLOOD BY AUTOMATED COUNT: 11.6 % (ref 11–15)
GLOBULIN PLAS-MCNC: 3.3 G/DL (ref 2.8–4.4)
GLUCOSE BLD-MCNC: 85 MG/DL (ref 70–99)
HCT VFR BLD AUTO: 41.7 % (ref 35–48)
HDLC SERPL-MCNC: 111 MG/DL (ref 40–59)
HGB BLD-MCNC: 14 G/DL (ref 12–16)
IMM GRANULOCYTES # BLD AUTO: 0.02 X10(3) UL (ref 0–1)
IMM GRANULOCYTES NFR BLD: 0.3 %
LDLC SERPL CALC-MCNC: 72 MG/DL (ref ?–100)
LYMPHOCYTES # BLD AUTO: 1.26 X10(3) UL (ref 1–4)
LYMPHOCYTES NFR BLD AUTO: 18.1 %
M PROTEIN MFR SERPL ELPH: 7.3 G/DL (ref 6.4–8.2)
MCH RBC QN AUTO: 32.2 PG (ref 26–34)
MCHC RBC AUTO-ENTMCNC: 33.6 G/DL (ref 31–37)
MCV RBC AUTO: 95.9 FL (ref 80–100)
MONOCYTES # BLD AUTO: 0.55 X10(3) UL (ref 0.1–1)
MONOCYTES NFR BLD AUTO: 7.9 %
NEUTROPHILS # BLD AUTO: 4.96 X10 (3) UL (ref 1.5–7.7)
NEUTROPHILS # BLD AUTO: 4.96 X10(3) UL (ref 1.5–7.7)
NEUTROPHILS NFR BLD AUTO: 71.4 %
NONHDLC SERPL-MCNC: 86 MG/DL (ref ?–130)
OSMOLALITY SERPL CALC.SUM OF ELEC: 281 MOSM/KG (ref 275–295)
PATIENT FASTING Y/N/NP: YES
PATIENT FASTING Y/N/NP: YES
PLATELET # BLD AUTO: 189 10(3)UL (ref 150–450)
POTASSIUM SERPL-SCNC: 4.2 MMOL/L (ref 3.5–5.1)
RBC # BLD AUTO: 4.35 X10(6)UL (ref 3.8–5.3)
RHEUMATOID FACT SERPL-ACNC: 11 IU/ML (ref ?–15)
SED RATE-ML: 20 MM/HR (ref 0–25)
SODIUM SERPL-SCNC: 134 MMOL/L (ref 136–145)
T4 FREE SERPL-MCNC: 1 NG/DL (ref 0.8–1.7)
TRIGL SERPL-MCNC: 68 MG/DL (ref 30–149)
TSI SER-ACNC: 1.6 MIU/ML (ref 0.36–3.74)
VIT B12 SERPL-MCNC: 753 PG/ML (ref 193–986)
VIT D+METAB SERPL-MCNC: 79.5 NG/ML (ref 30–100)
VLDLC SERPL CALC-MCNC: 14 MG/DL (ref 0–30)
WBC # BLD AUTO: 7 X10(3) UL (ref 4–11)

## 2020-03-06 PROCEDURE — 82728 ASSAY OF FERRITIN: CPT

## 2020-03-06 PROCEDURE — 86140 C-REACTIVE PROTEIN: CPT

## 2020-03-06 PROCEDURE — 84443 ASSAY THYROID STIM HORMONE: CPT

## 2020-03-06 PROCEDURE — 82306 VITAMIN D 25 HYDROXY: CPT

## 2020-03-06 PROCEDURE — 84439 ASSAY OF FREE THYROXINE: CPT

## 2020-03-06 PROCEDURE — 36415 COLL VENOUS BLD VENIPUNCTURE: CPT

## 2020-03-06 PROCEDURE — 85652 RBC SED RATE AUTOMATED: CPT

## 2020-03-06 PROCEDURE — 86200 CCP ANTIBODY: CPT

## 2020-03-06 PROCEDURE — 80053 COMPREHEN METABOLIC PANEL: CPT

## 2020-03-06 PROCEDURE — 86431 RHEUMATOID FACTOR QUANT: CPT

## 2020-03-06 PROCEDURE — 85025 COMPLETE CBC W/AUTO DIFF WBC: CPT

## 2020-03-06 PROCEDURE — 80061 LIPID PANEL: CPT

## 2020-03-06 PROCEDURE — 82607 VITAMIN B-12: CPT

## 2020-03-09 DIAGNOSIS — E87.1 LOW SODIUM LEVELS: Primary | ICD-10-CM

## 2020-03-09 LAB — CCP IGG SERPL-ACNC: 1 U/ML (ref 0–6.9)

## 2020-06-11 ENCOUNTER — HOSPITAL ENCOUNTER (OUTPATIENT)
Dept: MAMMOGRAPHY | Facility: HOSPITAL | Age: 60
Discharge: HOME | End: 2020-06-11
Attending: FAMILY MEDICINE
Payer: COMMERCIAL

## 2020-06-11 DIAGNOSIS — Z12.31 ENCOUNTER FOR SCREENING MAMMOGRAM FOR HIGH-RISK PATIENT: ICD-10-CM

## 2020-06-11 PROCEDURE — 77063 BREAST TOMOSYNTHESIS BI: CPT | Performed by: FAMILY MEDICINE

## 2020-06-11 PROCEDURE — 77067 SCR MAMMO BI INCL CAD: CPT | Performed by: FAMILY MEDICINE

## 2020-07-13 ENCOUNTER — TELEPHONE (OUTPATIENT)
Dept: INTERNAL MEDICINE CLINIC | Facility: HOSPITAL | Age: 60
End: 2020-07-13

## 2020-07-13 DIAGNOSIS — Z20.822 SUSPECTED 2019 NOVEL CORONAVIRUS INFECTION: Primary | ICD-10-CM

## 2020-07-14 ENCOUNTER — LAB ENCOUNTER (OUTPATIENT)
Dept: LAB | Facility: HOSPITAL | Age: 60
End: 2020-07-14
Attending: PREVENTIVE MEDICINE
Payer: COMMERCIAL

## 2020-07-14 DIAGNOSIS — Z20.822 SUSPECTED 2019 NOVEL CORONAVIRUS INFECTION: ICD-10-CM

## 2020-07-14 LAB — SARS-COV-2 RNA RESP QL NAA+PROBE: NOT DETECTED

## 2020-12-19 ENCOUNTER — IMMUNIZATION (OUTPATIENT)
Dept: LAB | Facility: HOSPITAL | Age: 60
End: 2020-12-19
Attending: PREVENTIVE MEDICINE

## 2020-12-19 DIAGNOSIS — Z23 NEED FOR VACCINATION: ICD-10-CM

## 2020-12-19 PROCEDURE — 0001A PFIZER-BIONTECH COVID-19 VACCINE: CPT

## 2021-01-09 ENCOUNTER — IMMUNIZATION (OUTPATIENT)
Dept: LAB | Facility: HOSPITAL | Age: 61
End: 2021-01-09
Attending: PREVENTIVE MEDICINE

## 2021-01-09 DIAGNOSIS — Z23 NEED FOR VACCINATION: ICD-10-CM

## 2021-01-09 PROCEDURE — 0002A SARSCOV2 VAC 30MCG/0.3ML IM: CPT

## 2021-01-29 ENCOUNTER — ORDER TRANSCRIPTION (OUTPATIENT)
Dept: PHYSICAL THERAPY | Facility: HOSPITAL | Age: 61
End: 2021-01-29

## 2021-01-29 DIAGNOSIS — M65.331 TRIGGER MIDDLE FINGER OF RIGHT HAND: Primary | ICD-10-CM

## 2021-02-02 ENCOUNTER — OFFICE VISIT (OUTPATIENT)
Dept: OCCUPATIONAL MEDICINE | Facility: HOSPITAL | Age: 61
End: 2021-02-02
Attending: ORTHOPAEDIC SURGERY
Payer: COMMERCIAL

## 2021-02-02 DIAGNOSIS — M65.331 TRIGGER MIDDLE FINGER OF RIGHT HAND: ICD-10-CM

## 2021-02-02 PROCEDURE — 97166 OT EVAL MOD COMPLEX 45 MIN: CPT

## 2021-02-02 PROCEDURE — 97110 THERAPEUTIC EXERCISES: CPT

## 2021-02-03 NOTE — PROGRESS NOTES
OCCUPATIONAL THERAPY UPPER EXTREMITY EVALUATION   Referring Physician: Dr. Janusz Jackson  Diagnosis: Trigger middle finger of right hand (V60.421)     Date of Service: 2/2/2021     PATIENT SUMMARY   Coty Lopez is a 64year old female who presents to t primary c/o decreased RUE function, ROM, increased pain and swelling. Pt underwent RUE trigger finger release surgery on 1/21/2021 and presents for OT evaluation today about 2 weeks post-op.  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   -Pt will demonstrate a decreased circumferential edema of RUE D3 P1 of at least 6.0cm indicating increased ability to manage flexion/extension of digits for IADL task management.  -Pt will demonstrate increased F

## 2021-02-04 ENCOUNTER — OFFICE VISIT (OUTPATIENT)
Dept: OCCUPATIONAL MEDICINE | Facility: HOSPITAL | Age: 61
End: 2021-02-04
Attending: ORTHOPAEDIC SURGERY
Payer: COMMERCIAL

## 2021-02-04 DIAGNOSIS — M65.331 TRIGGER MIDDLE FINGER OF RIGHT HAND: ICD-10-CM

## 2021-02-04 PROCEDURE — 97140 MANUAL THERAPY 1/> REGIONS: CPT

## 2021-02-04 PROCEDURE — 97760 ORTHOTIC MGMT&TRAING 1ST ENC: CPT

## 2021-02-04 PROCEDURE — 97530 THERAPEUTIC ACTIVITIES: CPT

## 2021-02-05 NOTE — PROGRESS NOTES
Dx: Trigger middle finger of right hand (M65.331)            Insurance (Authorized # of Visits):  6           Authorizing Physician: Dr. 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   -Pt will demonstrate a decreased circumferential edema of RUE D3 P1 of at least 6.0cm indicating increased ability to manage flexion/extension of digits for IADL task management.  -Pt will demonstr

## 2021-02-09 ENCOUNTER — OFFICE VISIT (OUTPATIENT)
Dept: OCCUPATIONAL MEDICINE | Facility: HOSPITAL | Age: 61
End: 2021-02-09
Attending: ORTHOPAEDIC SURGERY
Payer: COMMERCIAL

## 2021-02-09 DIAGNOSIS — M65.331 TRIGGER MIDDLE FINGER OF RIGHT HAND: ICD-10-CM

## 2021-02-09 PROCEDURE — 97110 THERAPEUTIC EXERCISES: CPT

## 2021-02-09 PROCEDURE — 97140 MANUAL THERAPY 1/> REGIONS: CPT

## 2021-02-10 NOTE — PROGRESS NOTES
Dx: Trigger middle finger of right hand (M65.331)            Insurance (Authorized # of Visits):  6           Authorizing Physician: Dr. Barbara Sparks  Next MD visit: 2/26/2021  Fall Risk: standard         Precautions: n/a             Subjective: \"I have been we management.  -Pt will demonstrate increased [address] of RUE by demonstrating at least 20 seconds on the Nine-Hole Peg Test indicating increased ability to manage functional IADL tasks.   -Pt will be independent and compliant with comprehensive HEP to maintain pr

## 2021-02-11 ENCOUNTER — OFFICE VISIT (OUTPATIENT)
Dept: OCCUPATIONAL MEDICINE | Facility: HOSPITAL | Age: 61
End: 2021-02-11
Attending: ORTHOPAEDIC SURGERY
Payer: COMMERCIAL

## 2021-02-11 DIAGNOSIS — M65.331 TRIGGER MIDDLE FINGER OF RIGHT HAND: ICD-10-CM

## 2021-02-11 PROCEDURE — 97110 THERAPEUTIC EXERCISES: CPT

## 2021-02-11 PROCEDURE — 97140 MANUAL THERAPY 1/> REGIONS: CPT

## 2021-02-12 NOTE — PROGRESS NOTES
Dx: Trigger middle finger of right hand (M65.331)            Insurance (Authorized # of Visits):  6           Authorizing Physician: Dr. Sharmila Ferraro  Next MD visit: 2/26/2021  Fall Risk: standard         Precautions: n/a             Subjective: \"I have been do tasks.  -Pt will be independent and compliant with comprehensive HEP to maintain progress achieved in OT.   *strengthening goals will be added when appropriate     Plan: Patient will continue to be seen for 2x/week or a total of 8 visits over a 90 day perio

## 2021-02-16 ENCOUNTER — OFFICE VISIT (OUTPATIENT)
Dept: OCCUPATIONAL MEDICINE | Facility: HOSPITAL | Age: 61
End: 2021-02-16
Attending: ORTHOPAEDIC SURGERY
Payer: COMMERCIAL

## 2021-02-16 DIAGNOSIS — M65.331 TRIGGER MIDDLE FINGER OF RIGHT HAND: ICD-10-CM

## 2021-02-16 PROCEDURE — 97110 THERAPEUTIC EXERCISES: CPT

## 2021-02-16 PROCEDURE — 97140 MANUAL THERAPY 1/> REGIONS: CPT

## 2021-02-17 NOTE — PROGRESS NOTES
Dx: Trigger middle finger of right hand (M65.331)            Insurance (Authorized # of Visits):  8           Authorizing Physician: Dr. 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   -Pt will demonstrate a decreased circumferential edema of RUE D3 P1 of at least 6.0cm indicating increased ability to manage flexion/extension of digits for IADL task management.  -Pt will demonstrate increased [address]  -Yellow Graded Digit Abduction and Extension w/ Rubber Band x15 each -Hot pack 5 min to RUE hand prior to PROM stretching  -PROM Stretching R D2 and D3 digit flexion and extension PIP, DIP, MCP  -Retrograde Massage and Hand Pumps for Edema Management

## 2021-02-18 ENCOUNTER — OFFICE VISIT (OUTPATIENT)
Dept: OCCUPATIONAL MEDICINE | Facility: HOSPITAL | Age: 61
End: 2021-02-18
Attending: ORTHOPAEDIC SURGERY
Payer: COMMERCIAL

## 2021-02-18 DIAGNOSIS — M65.331 TRIGGER MIDDLE FINGER OF RIGHT HAND: ICD-10-CM

## 2021-02-18 PROCEDURE — 97140 MANUAL THERAPY 1/> REGIONS: CPT

## 2021-02-18 PROCEDURE — 97110 THERAPEUTIC EXERCISES: CPT

## 2021-02-18 PROCEDURE — 97035 APP MDLTY 1+ULTRASOUND EA 15: CPT

## 2021-02-19 NOTE — PROGRESS NOTES
Dx: Trigger middle finger of right hand (M65.331)            Insurance (Authorized # of Visits):  8           Authorizing Physician: Dr. Shaq Moreno  Next MD visit: 2/26/2021  Fall Risk: standard         Precautions: n/a             Subjective: Pt reports she h will be independent and compliant with comprehensive HEP to maintain progress achieved in OT.   *strengthening goals will be added when appropriate     Plan: Patient will continue to be seen for 2x/week or a total of 8 visits over a 90 day period.  Treatmen 10 full ;  Red Digiflex Individual Digit x10 reps of 4  -Yellow Graded Digit Abduction and Extension w/ Rubber Band x15 each  US 1.2 w/cm2, 3.3 mhz, 50% duty, 8 min to R volar palm for adhesion management                       HEP: Tendon Gliding Exerci

## 2021-02-23 ENCOUNTER — OFFICE VISIT (OUTPATIENT)
Dept: OCCUPATIONAL MEDICINE | Facility: HOSPITAL | Age: 61
End: 2021-02-23
Attending: ORTHOPAEDIC SURGERY
Payer: COMMERCIAL

## 2021-02-23 DIAGNOSIS — M65.331 TRIGGER MIDDLE FINGER OF RIGHT HAND: ICD-10-CM

## 2021-02-23 PROCEDURE — 97035 APP MDLTY 1+ULTRASOUND EA 15: CPT

## 2021-02-23 PROCEDURE — 97110 THERAPEUTIC EXERCISES: CPT

## 2021-02-23 PROCEDURE — 97140 MANUAL THERAPY 1/> REGIONS: CPT

## 2021-02-24 NOTE — PROGRESS NOTES
Dx: Trigger middle finger of right hand (M65.331)            Insurance (Authorized # of Visits):  8           Authorizing Physician: Dr. Ildefonso Pete MD visit: 2/26/2021  Fall Risk: standard         Precautions: n/a             Subjective: Pt reports that demonstrating at least 20 seconds on the Nine-Hole Peg Test indicating increased ability to manage functional IADL tasks. -Pt will be independent and compliant with comprehensive HEP to maintain progress achieved in OT.   *strengthening goals will be added 3.3 mhz, 50% duty, 8 min to R volar palm for adhesion management  -3-Point Pinch Red and Green  Clip [employer] Activity for [facility]  -Yellow Graded Digit Abduction and Extension w/ Rubber Band x15 each     HEP: Tendon Gliding Exercises, Joint Blocking

## 2021-02-25 ENCOUNTER — APPOINTMENT (OUTPATIENT)
Dept: OCCUPATIONAL MEDICINE | Facility: HOSPITAL | Age: 61
End: 2021-02-25
Attending: ORTHOPAEDIC SURGERY
Payer: COMMERCIAL

## 2021-02-26 ENCOUNTER — TELEPHONE (OUTPATIENT)
Dept: PHYSICAL THERAPY | Facility: HOSPITAL | Age: 61
End: 2021-02-26

## 2021-03-02 ENCOUNTER — APPOINTMENT (OUTPATIENT)
Dept: OCCUPATIONAL MEDICINE | Facility: HOSPITAL | Age: 61
End: 2021-03-02
Attending: ORTHOPAEDIC SURGERY
Payer: COMMERCIAL

## 2021-03-04 ENCOUNTER — APPOINTMENT (OUTPATIENT)
Dept: OCCUPATIONAL MEDICINE | Facility: HOSPITAL | Age: 61
End: 2021-03-04
Attending: ORTHOPAEDIC SURGERY
Payer: COMMERCIAL

## 2021-03-08 ENCOUNTER — OFFICE VISIT (OUTPATIENT)
Dept: FAMILY MEDICINE CLINIC | Facility: CLINIC | Age: 61
End: 2021-03-08
Payer: COMMERCIAL

## 2021-03-08 VITALS
BODY MASS INDEX: 22.34 KG/M2 | HEART RATE: 76 BPM | WEIGHT: 139 LBS | SYSTOLIC BLOOD PRESSURE: 104 MMHG | TEMPERATURE: 98 F | HEIGHT: 66 IN | RESPIRATION RATE: 18 BRPM | DIASTOLIC BLOOD PRESSURE: 64 MMHG | OXYGEN SATURATION: 99 %

## 2021-03-08 DIAGNOSIS — Z00.00 ANNUAL PHYSICAL EXAM: ICD-10-CM

## 2021-03-08 DIAGNOSIS — Z01.419 WELL WOMAN EXAM WITH ROUTINE GYNECOLOGICAL EXAM: Primary | ICD-10-CM

## 2021-03-08 DIAGNOSIS — Z12.31 ENCOUNTER FOR SCREENING MAMMOGRAM FOR HIGH-RISK PATIENT: ICD-10-CM

## 2021-03-08 DIAGNOSIS — Z13.820 SCREENING FOR OSTEOPOROSIS: ICD-10-CM

## 2021-03-08 PROCEDURE — 3078F DIAST BP <80 MM HG: CPT | Performed by: FAMILY MEDICINE

## 2021-03-08 PROCEDURE — 87624 HPV HI-RISK TYP POOLED RSLT: CPT | Performed by: FAMILY MEDICINE

## 2021-03-08 PROCEDURE — 88175 CYTOPATH C/V AUTO FLUID REDO: CPT | Performed by: FAMILY MEDICINE

## 2021-03-08 PROCEDURE — 3074F SYST BP LT 130 MM HG: CPT | Performed by: FAMILY MEDICINE

## 2021-03-08 PROCEDURE — 99396 PREV VISIT EST AGE 40-64: CPT | Performed by: FAMILY MEDICINE

## 2021-03-08 PROCEDURE — 3008F BODY MASS INDEX DOCD: CPT | Performed by: FAMILY MEDICINE

## 2021-03-08 NOTE — PROGRESS NOTES
HPI:   Chriss Valencia is a 64year old female who presents for a complete physical exam.     Wt Readings from Last 6 Encounters:  03/08/21 : 139 lb (63 kg)  02/26/21 : 144 lb (65.3 kg)  01/29/21 : 144 lb (65.3 kg)  07/10/20 : 144 lb (65.3 kg)  03/02 In her 54's. Social History:   Social History    Tobacco Use      Smoking status: Never Smoker      Smokeless tobacco: Never Used    Alcohol use: No    Drug use: No    Occ: . : . Children: .    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 Well woman exam with routine gynecological exam      2. Annual physical exam    - FREE T4 (FREE THYROXINE); Future  - ASSAY, THYROID STIM HORMONE; Future  - LIPID PANEL;  Future  - CBC WITH DIFFERENTIAL WITH

## 2021-03-09 ENCOUNTER — APPOINTMENT (OUTPATIENT)
Dept: OCCUPATIONAL MEDICINE | Facility: HOSPITAL | Age: 61
End: 2021-03-09
Attending: ORTHOPAEDIC SURGERY
Payer: COMMERCIAL

## 2021-03-10 LAB — HPV I/H RISK 1 DNA SPEC QL NAA+PROBE: NEGATIVE

## 2021-03-11 ENCOUNTER — APPOINTMENT (OUTPATIENT)
Dept: OCCUPATIONAL MEDICINE | Facility: HOSPITAL | Age: 61
End: 2021-03-11
Attending: ORTHOPAEDIC SURGERY
Payer: COMMERCIAL

## 2021-04-07 ENCOUNTER — HOSPITAL ENCOUNTER (OUTPATIENT)
Dept: BONE DENSITY | Age: 61
Discharge: HOME | End: 2021-04-07
Attending: FAMILY MEDICINE
Payer: COMMERCIAL

## 2021-04-07 DIAGNOSIS — Z13.820 SCREENING FOR OSTEOPOROSIS: ICD-10-CM

## 2021-04-07 PROCEDURE — 77080 DXA BONE DENSITY AXIAL: CPT | Performed by: FAMILY MEDICINE

## 2021-04-12 ENCOUNTER — LAB ENCOUNTER (OUTPATIENT)
Dept: LAB | Age: 61
End: 2021-04-12
Attending: FAMILY MEDICINE
Payer: COMMERCIAL

## 2021-04-12 DIAGNOSIS — Z00.00 ANNUAL PHYSICAL EXAM: ICD-10-CM

## 2021-04-12 PROCEDURE — 82607 VITAMIN B-12: CPT

## 2021-04-12 PROCEDURE — 83550 IRON BINDING TEST: CPT

## 2021-04-12 PROCEDURE — 82728 ASSAY OF FERRITIN: CPT

## 2021-04-12 PROCEDURE — 36415 COLL VENOUS BLD VENIPUNCTURE: CPT

## 2021-04-12 PROCEDURE — 84443 ASSAY THYROID STIM HORMONE: CPT

## 2021-04-12 PROCEDURE — 84439 ASSAY OF FREE THYROXINE: CPT

## 2021-04-12 PROCEDURE — 80053 COMPREHEN METABOLIC PANEL: CPT

## 2021-04-12 PROCEDURE — 83540 ASSAY OF IRON: CPT

## 2021-04-12 PROCEDURE — 82306 VITAMIN D 25 HYDROXY: CPT

## 2021-04-12 PROCEDURE — 80061 LIPID PANEL: CPT

## 2021-04-12 PROCEDURE — 85025 COMPLETE CBC W/AUTO DIFF WBC: CPT

## 2021-04-27 ENCOUNTER — PATIENT MESSAGE (OUTPATIENT)
Dept: FAMILY MEDICINE CLINIC | Facility: CLINIC | Age: 61
End: 2021-04-27

## 2021-04-27 NOTE — TELEPHONE ENCOUNTER
From: Jesús Hector  To: Kira Avery DO  Sent: 4/27/2021 8:48 AM CDT  Subject: Other    Good Morning,  I have both health provider screening forms for myself and  (Dr. Marito Van patient), they need to be signed and faxed in for insurance di

## 2021-04-28 ENCOUNTER — TELEPHONE (OUTPATIENT)
Dept: FAMILY MEDICINE CLINIC | Facility: CLINIC | Age: 61
End: 2021-04-28

## 2021-05-17 ENCOUNTER — TELEPHONE (OUTPATIENT)
Dept: FAMILY MEDICINE CLINIC | Facility: CLINIC | Age: 61
End: 2021-05-17

## 2021-05-17 DIAGNOSIS — Z12.31 ENCOUNTER FOR SCREENING MAMMOGRAM FOR HIGH-RISK PATIENT: Primary | ICD-10-CM

## 2021-05-17 NOTE — TELEPHONE ENCOUNTER
3/8/21:  3. Encounter for screening mammogram for high-risk patient  - [facility] KIM 2D+3D DIAGNOSTIC [facility] [id number]);  Future  family history of breast  Cancer- mom 66's sister - 52's       6/11/2020: mammogram report:  RECOMMENDATIONS:     ROUTINE EMA

## 2021-06-14 ENCOUNTER — HOSPITAL ENCOUNTER (OUTPATIENT)
Dept: MAMMOGRAPHY | Facility: HOSPITAL | Age: 61
Discharge: HOME | End: 2021-06-14
Attending: FAMILY MEDICINE
Payer: COMMERCIAL

## 2021-06-14 DIAGNOSIS — Z12.31 ENCOUNTER FOR SCREENING MAMMOGRAM FOR HIGH-RISK PATIENT: ICD-10-CM

## 2021-06-14 PROCEDURE — 77063 BREAST TOMOSYNTHESIS BI: CPT | Performed by: FAMILY MEDICINE

## 2021-06-14 PROCEDURE — 77067 SCR MAMMO BI INCL CAD: CPT | Performed by: FAMILY MEDICINE

## 2021-10-01 ENCOUNTER — IMMUNIZATION (OUTPATIENT)
Dept: LAB | Facility: HOSPITAL | Age: 61
End: 2021-10-01
Attending: EMERGENCY MEDICINE
Payer: COMMERCIAL

## 2021-10-01 DIAGNOSIS — Z23 NEED FOR VACCINATION: Primary | ICD-10-CM

## 2021-10-01 PROCEDURE — 0004A SARSCOV2 VAC 30MCG/0.3ML IM: CPT

## 2021-10-01 PROCEDURE — 0003A SARSCOV2 VAC 30MCG/0.3ML IM: CPT

## 2021-11-30 ENCOUNTER — HOSPITAL ENCOUNTER (OUTPATIENT)
Age: 61
Discharge: HOME | End: 2021-11-30
Attending: EMERGENCY MEDICINE
Payer: COMMERCIAL

## 2021-11-30 VITALS
SYSTOLIC BLOOD PRESSURE: 114 MMHG | TEMPERATURE: 97 F | DIASTOLIC BLOOD PRESSURE: 82 MMHG | HEART RATE: 85 BPM | RESPIRATION RATE: 16 BRPM | OXYGEN SATURATION: 97 %

## 2021-11-30 DIAGNOSIS — S05.01XA CONJUNCTIVAL ABRASION, RIGHT, INITIAL ENCOUNTER: Primary | ICD-10-CM

## 2021-11-30 PROCEDURE — 99203 OFFICE O/P NEW LOW 30 MIN: CPT

## 2021-11-30 PROCEDURE — 99213 OFFICE O/P EST LOW 20 MIN: CPT

## 2021-11-30 RX ORDER — TOBRAMYCIN 3 MG/ML
2 SOLUTION/ DROPS OPHTHALMIC EVERY 4 HOURS
Qty: 1 EACH | Refills: 0 | Status: SHIPPED | OUTPATIENT
Start: 2021-11-30 | End: 2021-12-10

## 2021-11-30 RX ORDER — KETOROLAC TROMETHAMINE 5 MG/ML
1 SOLUTION OPHTHALMIC 4 TIMES DAILY
Qty: 1 EACH | Refills: 0 | Status: SHIPPED | OUTPATIENT
Start: 2021-11-30 | End: 2021-12-05

## 2021-11-30 NOTE — ED INITIAL ASSESSMENT (HPI)
Right eye injury. Saturday got poked by a twig.  Redness is more, soreness,  Denies itching, discharge or fever

## 2021-11-30 NOTE — ED PROVIDER NOTES
Patient Seen in: [facility] [place]      History   Patient presents with:  Eye Problem    Stated Complaint: Right eye injury 3 days    Subjective:   HPI    68-year-old female comes to the hospital complaint of having difficulty with having had a without significant depth and follow-up         MDM      Patient was screened and evaluated during this visit.    As a treating physician attending to the patient, I determined, within reasonable clinical confidence and prior to discharge, that an emergency

## 2022-03-15 ENCOUNTER — OFFICE VISIT (OUTPATIENT)
Dept: FAMILY MEDICINE CLINIC | Facility: CLINIC | Age: 62
End: 2022-03-15
Payer: COMMERCIAL

## 2022-03-15 VITALS
DIASTOLIC BLOOD PRESSURE: 74 MMHG | TEMPERATURE: 98 F | RESPIRATION RATE: 18 BRPM | HEIGHT: 66 IN | WEIGHT: 136 LBS | HEART RATE: 68 BPM | BODY MASS INDEX: 21.86 KG/M2 | SYSTOLIC BLOOD PRESSURE: 118 MMHG | OXYGEN SATURATION: 98 %

## 2022-03-15 DIAGNOSIS — Z00.00 ANNUAL PHYSICAL EXAM: ICD-10-CM

## 2022-03-15 DIAGNOSIS — Z12.31 ENCOUNTER FOR SCREENING MAMMOGRAM FOR HIGH-RISK PATIENT: ICD-10-CM

## 2022-03-15 DIAGNOSIS — Z01.419 WELL WOMAN EXAM WITH ROUTINE GYNECOLOGICAL EXAM: Primary | ICD-10-CM

## 2022-03-15 PROCEDURE — 90471 IMMUNIZATION ADMIN: CPT | Performed by: FAMILY MEDICINE

## 2022-03-15 PROCEDURE — 3008F BODY MASS INDEX DOCD: CPT | Performed by: FAMILY MEDICINE

## 2022-03-15 PROCEDURE — 99396 PREV VISIT EST AGE 40-64: CPT | Performed by: FAMILY MEDICINE

## 2022-03-15 PROCEDURE — 90750 HZV VACC RECOMBINANT IM: CPT | Performed by: FAMILY MEDICINE

## 2022-03-15 PROCEDURE — 3078F DIAST BP <80 MM HG: CPT | Performed by: FAMILY MEDICINE

## 2022-03-15 PROCEDURE — 3074F SYST BP LT 130 MM HG: CPT | Performed by: FAMILY MEDICINE

## 2022-03-21 ENCOUNTER — LAB ENCOUNTER (OUTPATIENT)
Dept: LAB | Age: 62
End: 2022-03-21
Attending: FAMILY MEDICINE
Payer: COMMERCIAL

## 2022-03-21 DIAGNOSIS — Z00.00 ANNUAL PHYSICAL EXAM: ICD-10-CM

## 2022-03-21 LAB
ALBUMIN SERPL-MCNC: 4 G/DL (ref 3.4–5)
ALBUMIN/GLOB SERPL: 1.4 {RATIO} (ref 1–2)
ALP LIVER SERPL-CCNC: 93 U/L
ALT SERPL-CCNC: 30 U/L
ANION GAP SERPL CALC-SCNC: 4 MMOL/L (ref 0–18)
AST SERPL-CCNC: 36 U/L (ref 15–37)
BASOPHILS # BLD AUTO: 0.05 X10(3) UL (ref 0–0.2)
BASOPHILS NFR BLD AUTO: 1.1 %
BILIRUB SERPL-MCNC: 0.4 MG/DL (ref 0.1–2)
BUN BLD-MCNC: 16 MG/DL (ref 7–18)
CALCIUM BLD-MCNC: 9.3 MG/DL (ref 8.5–10.1)
CHLORIDE SERPL-SCNC: 106 MMOL/L (ref 98–112)
CHOLEST SERPL-MCNC: 195 MG/DL (ref ?–200)
CO2 SERPL-SCNC: 30 MMOL/L (ref 21–32)
CREAT BLD-MCNC: 0.62 MG/DL
DEPRECATED HBV CORE AB SER IA-ACNC: 26.8 NG/ML
EOSINOPHIL # BLD AUTO: 0.12 X10(3) UL (ref 0–0.7)
EOSINOPHIL NFR BLD AUTO: 2.7 %
ERYTHROCYTE [DISTWIDTH] IN BLOOD BY AUTOMATED COUNT: 11.9 %
FASTING PATIENT LIPID ANSWER: YES
FASTING STATUS PATIENT QL REPORTED: YES
GLOBULIN PLAS-MCNC: 2.9 G/DL (ref 2.8–4.4)
GLUCOSE BLD-MCNC: 96 MG/DL (ref 70–99)
HCT VFR BLD AUTO: 41 %
HDLC SERPL-MCNC: 111 MG/DL (ref 40–59)
HGB BLD-MCNC: 13.3 G/DL
IMM GRANULOCYTES # BLD AUTO: 0.01 X10(3) UL (ref 0–1)
IMM GRANULOCYTES NFR BLD: 0.2 %
IRON SATN MFR SERPL: 26 %
IRON SERPL-MCNC: 100 UG/DL
LDLC SERPL CALC-MCNC: 76 MG/DL (ref ?–100)
LYMPHOCYTES # BLD AUTO: 1.39 X10(3) UL (ref 1–4)
LYMPHOCYTES NFR BLD AUTO: 31 %
MCH RBC QN AUTO: 31.7 PG (ref 26–34)
MCV RBC AUTO: 97.6 FL
MONOCYTES # BLD AUTO: 0.52 X10(3) UL (ref 0.1–1)
MONOCYTES NFR BLD AUTO: 11.6 %
NEUTROPHILS # BLD AUTO: 2.39 X10 (3) UL (ref 1.5–7.7)
NEUTROPHILS # BLD AUTO: 2.39 X10(3) UL (ref 1.5–7.7)
NEUTROPHILS NFR BLD AUTO: 53.4 %
NONHDLC SERPL-MCNC: 84 MG/DL (ref ?–130)
OSMOLALITY SERPL CALC.SUM OF ELEC: 291 MOSM/KG (ref 275–295)
PLATELET # BLD AUTO: 181 10(3)UL (ref 150–450)
POTASSIUM SERPL-SCNC: 4.4 MMOL/L (ref 3.5–5.1)
PROT SERPL-MCNC: 6.9 G/DL (ref 6.4–8.2)
RBC # BLD AUTO: 4.2 X10(6)UL
SODIUM SERPL-SCNC: 140 MMOL/L (ref 136–145)
T4 FREE SERPL-MCNC: 1.1 NG/DL (ref 0.8–1.7)
TIBC SERPL-MCNC: 381 UG/DL (ref 240–450)
TRANSFERRIN SERPL-MCNC: 256 MG/DL (ref 200–360)
TRIGL SERPL-MCNC: 37 MG/DL (ref 30–149)
VIT B12 SERPL-MCNC: 1044 PG/ML (ref 193–986)
VLDLC SERPL CALC-MCNC: 6 MG/DL (ref 0–30)
WBC # BLD AUTO: 4.5 X10(3) UL (ref 4–11)

## 2022-03-21 PROCEDURE — 80053 COMPREHEN METABOLIC PANEL: CPT

## 2022-03-21 PROCEDURE — 83540 ASSAY OF IRON: CPT

## 2022-03-21 PROCEDURE — 85025 COMPLETE CBC W/AUTO DIFF WBC: CPT

## 2022-03-21 PROCEDURE — 82306 VITAMIN D 25 HYDROXY: CPT

## 2022-03-21 PROCEDURE — 83550 IRON BINDING TEST: CPT

## 2022-03-21 PROCEDURE — 36415 COLL VENOUS BLD VENIPUNCTURE: CPT

## 2022-03-21 PROCEDURE — 84439 ASSAY OF FREE THYROXINE: CPT

## 2022-03-21 PROCEDURE — 84443 ASSAY THYROID STIM HORMONE: CPT

## 2022-03-21 PROCEDURE — 82607 VITAMIN B-12: CPT

## 2022-03-21 PROCEDURE — 80061 LIPID PANEL: CPT

## 2022-03-21 PROCEDURE — 82728 ASSAY OF FERRITIN: CPT

## 2022-06-14 ENCOUNTER — NURSE ONLY (OUTPATIENT)
Dept: FAMILY MEDICINE CLINIC | Facility: CLINIC | Age: 62
End: 2022-06-14
Payer: COMMERCIAL

## 2022-06-14 PROCEDURE — 90471 IMMUNIZATION ADMIN: CPT | Performed by: FAMILY MEDICINE

## 2022-06-14 PROCEDURE — 90750 HZV VACC RECOMBINANT IM: CPT | Performed by: FAMILY MEDICINE

## 2022-06-21 ENCOUNTER — HOSPITAL ENCOUNTER (OUTPATIENT)
Dept: MAMMOGRAPHY | Facility: HOSPITAL | Age: 62
Discharge: HOME | End: 2022-06-21
Attending: FAMILY MEDICINE
Payer: COMMERCIAL

## 2022-06-21 DIAGNOSIS — Z12.31 ENCOUNTER FOR SCREENING MAMMOGRAM FOR HIGH-RISK PATIENT: ICD-10-CM

## 2022-06-21 PROCEDURE — 77067 SCR MAMMO BI INCL CAD: CPT | Performed by: FAMILY MEDICINE

## 2022-06-21 PROCEDURE — 77063 BREAST TOMOSYNTHESIS BI: CPT | Performed by: FAMILY MEDICINE

## 2022-09-22 ENCOUNTER — IMMUNIZATION (OUTPATIENT)
Dept: LAB | Age: 62
End: 2022-09-22
Attending: EMERGENCY MEDICINE

## 2022-09-22 DIAGNOSIS — Z23 NEED FOR VACCINATION: Primary | ICD-10-CM

## 2022-09-22 PROCEDURE — 0124A SARSCOV2 VAC BVL 30MCG/0.3ML: CPT

## 2022-10-24 ENCOUNTER — IMMUNIZATION (OUTPATIENT)
Dept: LAB | Facility: HOSPITAL | Age: 62
End: 2022-10-24
Attending: PREVENTIVE MEDICINE
Payer: COMMERCIAL

## 2022-10-24 DIAGNOSIS — Z23 NEED FOR VACCINATION: Primary | ICD-10-CM

## 2022-10-24 PROCEDURE — 90471 IMMUNIZATION ADMIN: CPT

## 2023-02-15 ENCOUNTER — APPOINTMENT (OUTPATIENT)
Dept: URBAN - METROPOLITAN AREA CLINIC 245 | Age: 63
Setting detail: DERMATOLOGY
End: 2023-02-15

## 2023-02-15 DIAGNOSIS — B07.8 OTHER VIRAL WARTS: ICD-10-CM

## 2023-02-15 DIAGNOSIS — L57.8 OTHER SKIN CHANGES DUE TO CHRONIC EXPOSURE TO NONIONIZING RADIATION: ICD-10-CM

## 2023-02-15 DIAGNOSIS — D485 NEOPLASM OF UNCERTAIN BEHAVIOR OF SKIN: ICD-10-CM

## 2023-02-15 DIAGNOSIS — L57.0 ACTINIC KERATOSIS: ICD-10-CM

## 2023-02-15 PROBLEM — D48.5 NEOPLASM OF UNCERTAIN BEHAVIOR OF SKIN: Status: ACTIVE | Noted: 2023-02-15

## 2023-02-15 PROCEDURE — A4550 SURGICAL TRAYS: HCPCS

## 2023-02-15 PROCEDURE — 11305 SHAVE SKIN LESION 0.5 CM/<: CPT | Mod: 59

## 2023-02-15 PROCEDURE — 17110 DESTRUCT B9 LESION 1-14: CPT

## 2023-02-15 PROCEDURE — OTHER LIQUID NITROGEN: OTHER

## 2023-02-15 PROCEDURE — 99203 OFFICE O/P NEW LOW 30 MIN: CPT | Mod: 25

## 2023-02-15 PROCEDURE — 17000 DESTRUCT PREMALG LESION: CPT | Mod: 59

## 2023-02-15 PROCEDURE — OTHER SHAVE REMOVAL: OTHER

## 2023-02-15 PROCEDURE — OTHER COUNSELING: OTHER

## 2023-02-15 ASSESSMENT — LOCATION ZONE DERM
LOCATION ZONE: LEG
LOCATION ZONE: TRUNK
LOCATION ZONE: HAND

## 2023-02-15 ASSESSMENT — LOCATION SIMPLE DESCRIPTION DERM
LOCATION SIMPLE: RIGHT CALF
LOCATION SIMPLE: RIGHT HAND
LOCATION SIMPLE: CHEST

## 2023-02-15 ASSESSMENT — LOCATION DETAILED DESCRIPTION DERM
LOCATION DETAILED: RIGHT PROXIMAL CALF
LOCATION DETAILED: UPPER STERNUM
LOCATION DETAILED: RIGHT RADIAL DORSAL HAND
LOCATION DETAILED: LOWER STERNUM

## 2023-02-15 NOTE — PROCEDURE: LIQUID NITROGEN
Application Tool (Optional): Cry-AC
Duration Of Freeze Thaw-Cycle (Seconds): 3
Spray Paint Technique: No
Render Post-Care Instructions In Note?: yes
Number Of Freeze-Thaw Cycles: 1 freeze-thaw cycle
Spray Paint Text: The liquid nitrogen was applied to the skin utilizing a spray paint frosting technique.
Detail Level: Detailed
Consent: The patient's consent was obtained including but not limited to risks of crusting, scabbing, blistering, scarring, darker or lighter pigmentary change, recurrence, incomplete removal and infection.
Post-Care Instructions: We verbally reviewed with the patient in detail post-care instructions. Patient is to wear sun protection and avoid picking at any of the treated lesions. Pt may apply Vaseline to crusted or scabbing areas. Call with any concerns. Should any area treated toady recur, RTC for evaluation and further management.
Medical Necessity Information: It is in your best interest to select a reason for this procedure from the list below. All of these items fulfill various CMS LCD requirements except the new and changing color options.
Consent: The patient's verbal consent was obtained including but not limited to risks of crusting, scabbing, blistering, scarring, darker or lighter pigmentary change, recurrence, incomplete removal and infection.
Medical Necessity Clause: This procedure was medically necessary because the lesions that were treated were:
Post-Care Instructions: I reviewed with the patient in detail post-care instructions. Patient is to wear sunprotection, and avoid picking at any of the treated lesions. Pt may apply Vaseline to crusted or scabbing areas.

## 2023-02-15 NOTE — PROCEDURE: SHAVE REMOVAL
Consent was obtained from the patient. The risks and benefits to therapy were discussed in detail. Specifically, the risks of infection, scarring, bleeding, prolonged wound healing, incomplete removal, allergy to anesthesia, nerve injury and recurrence were addressed. Prior to the procedure, the treatment site was clearly identified and confirmed by the patient. All components of Universal Protocol/PAUSE Rule completed.
Hemostasis: Drysol
Body Location Override (Optional - Billing Will Still Be Based On Selected Body Map Location If Applicable): right dorsal hand
Biopsy Method: Dermablade
Medical Necessity Clause: This procedure was medically necessary because the lesion that was treated was:
Notification Instructions: Patient will be notified of pathology results which on average takes 2 weeks.
Add Variable For Additional Medical Justification: No
Depth Of Shave: dermis
Was A Bandage Applied: Yes
Billing Type: Third-Party Bill
X Size Of Lesion In Cm (Optional): 0
Detail Level: Detailed
Size Of Lesion In Cm (Required): 0.5
Medical Necessity Information: It is in your best interest to select a reason for this procedure from the list below. All of these items fulfill various CMS LCD requirements except the new and changing color options.
Anesthesia Type: 1% lidocaine with epinephrine
Wound Care: Petrolatum
Post-Care Instructions: I reviewed with the patient in detail post-care instructions. Patient is to keep the biopsy site dry overnight, and then apply Vaseline or Aquaphor twice daily until healed. Wash area daily with soap and water. Call with any concerns with the way the site looks or feels.
Anesthesia Volume In Cc: 1

## 2023-03-29 ENCOUNTER — OFFICE VISIT (OUTPATIENT)
Dept: FAMILY MEDICINE CLINIC | Facility: CLINIC | Age: 63
End: 2023-03-29
Payer: COMMERCIAL

## 2023-03-29 VITALS
WEIGHT: 133 LBS | DIASTOLIC BLOOD PRESSURE: 56 MMHG | HEIGHT: 66 IN | BODY MASS INDEX: 21.38 KG/M2 | SYSTOLIC BLOOD PRESSURE: 102 MMHG | RESPIRATION RATE: 16 BRPM | HEART RATE: 63 BPM | OXYGEN SATURATION: 97 %

## 2023-03-29 DIAGNOSIS — Z00.00 ANNUAL PHYSICAL EXAM: ICD-10-CM

## 2023-03-29 DIAGNOSIS — Z13.820 SCREENING FOR OSTEOPOROSIS: ICD-10-CM

## 2023-03-29 DIAGNOSIS — Z01.419 WELL WOMAN EXAM WITH ROUTINE GYNECOLOGICAL EXAM: Primary | ICD-10-CM

## 2023-03-29 DIAGNOSIS — Z12.31 ENCOUNTER FOR SCREENING MAMMOGRAM FOR HIGH-RISK PATIENT: ICD-10-CM

## 2023-03-29 PROCEDURE — 99396 PREV VISIT EST AGE 40-64: CPT | Performed by: FAMILY MEDICINE

## 2023-03-29 PROCEDURE — 3008F BODY MASS INDEX DOCD: CPT | Performed by: FAMILY MEDICINE

## 2023-03-29 PROCEDURE — 3074F SYST BP LT 130 MM HG: CPT | Performed by: FAMILY MEDICINE

## 2023-03-29 PROCEDURE — 3078F DIAST BP <80 MM HG: CPT | Performed by: FAMILY MEDICINE

## 2023-04-03 ENCOUNTER — LAB ENCOUNTER (OUTPATIENT)
Dept: LAB | Age: 63
End: 2023-04-03
Payer: COMMERCIAL

## 2023-04-03 DIAGNOSIS — Z00.00 ANNUAL PHYSICAL EXAM: ICD-10-CM

## 2023-04-03 LAB
ALBUMIN SERPL-MCNC: 4 G/DL (ref 3.4–5)
ALBUMIN/GLOB SERPL: 1.4 {RATIO} (ref 1–2)
ALP LIVER SERPL-CCNC: 92 U/L
ALT SERPL-CCNC: 32 U/L
ANION GAP SERPL CALC-SCNC: 1 MMOL/L (ref 0–18)
AST SERPL-CCNC: 32 U/L (ref 15–37)
BASOPHILS # BLD AUTO: 0.04 X10(3) UL (ref 0–0.2)
BASOPHILS NFR BLD AUTO: 0.9 %
BILIRUB SERPL-MCNC: 0.5 MG/DL (ref 0.1–2)
BUN BLD-MCNC: 12 MG/DL (ref 7–18)
CALCIUM BLD-MCNC: 9.6 MG/DL (ref 8.5–10.1)
CHLORIDE SERPL-SCNC: 106 MMOL/L (ref 98–112)
CHOLEST SERPL-MCNC: 201 MG/DL (ref ?–200)
CO2 SERPL-SCNC: 29 MMOL/L (ref 21–32)
CREAT BLD-MCNC: 0.74 MG/DL
DEPRECATED HBV CORE AB SER IA-ACNC: 18.6 NG/ML
EOSINOPHIL # BLD AUTO: 0.1 X10(3) UL (ref 0–0.7)
EOSINOPHIL NFR BLD AUTO: 2.2 %
ERYTHROCYTE [DISTWIDTH] IN BLOOD BY AUTOMATED COUNT: 12.4 %
EST. AVERAGE GLUCOSE BLD GHB EST-MCNC: 117 MG/DL (ref 68–126)
FASTING PATIENT LIPID ANSWER: YES
FASTING STATUS PATIENT QL REPORTED: YES
GFR SERPLBLD BASED ON 1.73 SQ M-ARVRAT: 93 ML/MIN/1.73M2 (ref 60–?)
GLOBULIN PLAS-MCNC: 2.8 G/DL (ref 2.8–4.4)
GLUCOSE BLD-MCNC: 92 MG/DL (ref 70–99)
HBA1C MFR BLD: 5.7 % (ref ?–5.7)
HCT VFR BLD AUTO: 40.9 %
HDLC SERPL-MCNC: 135 MG/DL (ref 40–59)
HGB BLD-MCNC: 13.7 G/DL
IMM GRANULOCYTES # BLD AUTO: 0.01 X10(3) UL (ref 0–1)
IMM GRANULOCYTES NFR BLD: 0.2 %
IRON SATN MFR SERPL: 31 %
IRON SERPL-MCNC: 120 UG/DL
LDLC SERPL CALC-MCNC: 56 MG/DL (ref ?–100)
LYMPHOCYTES # BLD AUTO: 1.31 X10(3) UL (ref 1–4)
LYMPHOCYTES NFR BLD AUTO: 29.4 %
MCH RBC QN AUTO: 32.5 PG (ref 26–34)
MCHC RBC AUTO-ENTMCNC: 33.5 G/DL (ref 31–37)
MCV RBC AUTO: 97.1 FL
MONOCYTES # BLD AUTO: 0.51 X10(3) UL (ref 0.1–1)
MONOCYTES NFR BLD AUTO: 11.5 %
NEUTROPHILS # BLD AUTO: 2.48 X10 (3) UL (ref 1.5–7.7)
NEUTROPHILS # BLD AUTO: 2.48 X10(3) UL (ref 1.5–7.7)
NEUTROPHILS NFR BLD AUTO: 55.8 %
NONHDLC SERPL-MCNC: 66 MG/DL (ref ?–130)
OSMOLALITY SERPL CALC.SUM OF ELEC: 281 MOSM/KG (ref 275–295)
PLATELET # BLD AUTO: 173 10(3)UL (ref 150–450)
POTASSIUM SERPL-SCNC: 3.9 MMOL/L (ref 3.5–5.1)
PROT SERPL-MCNC: 6.8 G/DL (ref 6.4–8.2)
RBC # BLD AUTO: 4.21 X10(6)UL
SODIUM SERPL-SCNC: 136 MMOL/L (ref 136–145)
T4 FREE SERPL-MCNC: 1 NG/DL (ref 0.8–1.7)
TIBC SERPL-MCNC: 381 UG/DL (ref 240–450)
TRANSFERRIN SERPL-MCNC: 256 MG/DL (ref 200–360)
TRIGL SERPL-MCNC: 52 MG/DL (ref 30–149)
TSI SER-ACNC: 1.92 MIU/ML (ref 0.36–3.74)
VIT B12 SERPL-MCNC: 915 PG/ML (ref 193–986)
VIT D+METAB SERPL-MCNC: 68.7 NG/ML (ref 30–100)
VLDLC SERPL CALC-MCNC: 8 MG/DL (ref 0–30)
WBC # BLD AUTO: 4.5 X10(3) UL (ref 4–11)

## 2023-04-03 PROCEDURE — 36415 COLL VENOUS BLD VENIPUNCTURE: CPT

## 2023-04-03 PROCEDURE — 84439 ASSAY OF FREE THYROXINE: CPT

## 2023-04-03 PROCEDURE — 83550 IRON BINDING TEST: CPT

## 2023-04-03 PROCEDURE — 80053 COMPREHEN METABOLIC PANEL: CPT

## 2023-04-03 PROCEDURE — 83036 HEMOGLOBIN GLYCOSYLATED A1C: CPT

## 2023-04-03 PROCEDURE — 84443 ASSAY THYROID STIM HORMONE: CPT

## 2023-04-03 PROCEDURE — 83540 ASSAY OF IRON: CPT

## 2023-04-03 PROCEDURE — 82306 VITAMIN D 25 HYDROXY: CPT

## 2023-04-03 PROCEDURE — 80061 LIPID PANEL: CPT

## 2023-04-03 PROCEDURE — 82728 ASSAY OF FERRITIN: CPT

## 2023-04-03 PROCEDURE — 82607 VITAMIN B-12: CPT

## 2023-04-03 PROCEDURE — 85025 COMPLETE CBC W/AUTO DIFF WBC: CPT

## 2023-04-04 ENCOUNTER — PATIENT MESSAGE (OUTPATIENT)
Dept: FAMILY MEDICINE CLINIC | Facility: CLINIC | Age: 63
End: 2023-04-04

## 2023-04-04 ENCOUNTER — TELEPHONE (OUTPATIENT)
Dept: FAMILY MEDICINE CLINIC | Facility: CLINIC | Age: 63
End: 2023-04-04

## 2023-04-04 NOTE — TELEPHONE ENCOUNTER
From: Amie Portillo  To: Geraldine Raymond DO  Sent: 4/4/2023 8:46 AM CDT  Subject: Labs/Health Form    Hi Dr. Gustavo Das,  I have faxed over my health provider screening form to be signed in order to receive a discount for health insurance at work. Also, I read my message about my labs and will repeat them in three months. Thank you for your time into this matter.     Kelsey Leija

## 2023-04-12 ENCOUNTER — HOSPITAL ENCOUNTER (OUTPATIENT)
Dept: BONE DENSITY | Age: 63
Discharge: HOME | End: 2023-04-12
Attending: FAMILY MEDICINE
Payer: COMMERCIAL

## 2023-04-12 DIAGNOSIS — Z13.820 SCREENING FOR OSTEOPOROSIS: ICD-10-CM

## 2023-04-12 PROCEDURE — 77080 DXA BONE DENSITY AXIAL: CPT | Performed by: FAMILY MEDICINE

## 2023-07-06 ENCOUNTER — HOSPITAL ENCOUNTER (OUTPATIENT)
Dept: MAMMOGRAPHY | Facility: HOSPITAL | Age: 63
Discharge: HOME | End: 2023-07-06
Attending: FAMILY MEDICINE
Payer: COMMERCIAL

## 2023-07-06 DIAGNOSIS — Z12.31 ENCOUNTER FOR SCREENING MAMMOGRAM FOR HIGH-RISK PATIENT: ICD-10-CM

## 2023-07-06 PROCEDURE — 77067 SCR MAMMO BI INCL CAD: CPT | Performed by: FAMILY MEDICINE

## 2023-07-06 PROCEDURE — 77063 BREAST TOMOSYNTHESIS BI: CPT | Performed by: FAMILY MEDICINE

## 2023-10-04 ENCOUNTER — OFFICE VISIT (OUTPATIENT)
Dept: RHEUMATOLOGY | Facility: CLINIC | Age: 63
End: 2023-10-04
Payer: COMMERCIAL

## 2023-10-04 VITALS
DIASTOLIC BLOOD PRESSURE: 60 MMHG | HEART RATE: 62 BPM | HEIGHT: 66 IN | TEMPERATURE: 98 F | BODY MASS INDEX: 21.69 KG/M2 | OXYGEN SATURATION: 99 % | RESPIRATION RATE: 16 BRPM | WEIGHT: 135 LBS | SYSTOLIC BLOOD PRESSURE: 100 MMHG

## 2023-10-04 DIAGNOSIS — M79.642 BILATERAL HAND PAIN: ICD-10-CM

## 2023-10-04 DIAGNOSIS — M81.0 AGE-RELATED OSTEOPOROSIS WITHOUT CURRENT PATHOLOGICAL FRACTURE: Primary | ICD-10-CM

## 2023-10-04 DIAGNOSIS — M79.641 BILATERAL HAND PAIN: ICD-10-CM

## 2023-10-04 DIAGNOSIS — E55.9 VITAMIN D DEFICIENCY: ICD-10-CM

## 2023-10-04 PROCEDURE — 99243 OFF/OP CNSLTJ NEW/EST LOW 30: CPT | Performed by: INTERNAL MEDICINE

## 2023-10-04 PROCEDURE — 3008F BODY MASS INDEX DOCD: CPT | Performed by: INTERNAL MEDICINE

## 2023-10-04 PROCEDURE — 3074F SYST BP LT 130 MM HG: CPT | Performed by: INTERNAL MEDICINE

## 2023-10-04 PROCEDURE — 3078F DIAST BP <80 MM HG: CPT | Performed by: INTERNAL MEDICINE

## 2023-10-04 RX ORDER — IBANDRONATE SODIUM 150 MG/1
150 TABLET, FILM COATED ORAL
Qty: 3 TABLET | Refills: 1 | Status: SHIPPED | OUTPATIENT
Start: 2023-10-04

## 2023-10-04 RX ORDER — MULTIVITAMIN WITH IRON
250 TABLET ORAL
COMMUNITY

## 2023-10-05 ENCOUNTER — LAB ENCOUNTER (OUTPATIENT)
Dept: LAB | Age: 63
End: 2023-10-05
Attending: INTERNAL MEDICINE
Payer: COMMERCIAL

## 2023-10-05 ENCOUNTER — HOSPITAL ENCOUNTER (OUTPATIENT)
Dept: GENERAL RADIOLOGY | Facility: HOSPITAL | Age: 63
Discharge: HOME | End: 2023-10-05
Attending: INTERNAL MEDICINE
Payer: COMMERCIAL

## 2023-10-05 DIAGNOSIS — M79.641 BILATERAL HAND PAIN: ICD-10-CM

## 2023-10-05 DIAGNOSIS — E55.9 VITAMIN D DEFICIENCY: ICD-10-CM

## 2023-10-05 DIAGNOSIS — R79.0 DECREASED FERRITIN: ICD-10-CM

## 2023-10-05 DIAGNOSIS — M81.0 AGE-RELATED OSTEOPOROSIS WITHOUT CURRENT PATHOLOGICAL FRACTURE: ICD-10-CM

## 2023-10-05 DIAGNOSIS — R73.9 BLOOD GLUCOSE ELEVATED: ICD-10-CM

## 2023-10-05 DIAGNOSIS — M79.642 BILATERAL HAND PAIN: ICD-10-CM

## 2023-10-05 LAB
ANION GAP SERPL CALC-SCNC: 6 MMOL/L (ref 0–18)
BUN BLD-MCNC: 18 MG/DL (ref 7–18)
CALCIUM BLD-MCNC: 9.2 MG/DL (ref 8.5–10.1)
CHLORIDE SERPL-SCNC: 104 MMOL/L (ref 98–112)
CO2 SERPL-SCNC: 28 MMOL/L (ref 21–32)
CREAT BLD-MCNC: 0.81 MG/DL
DEPRECATED HBV CORE AB SER IA-ACNC: 25.3 NG/ML
EGFRCR SERPLBLD CKD-EPI 2021: 83 ML/MIN/1.73M2 (ref 60–?)
FASTING STATUS PATIENT QL REPORTED: NO
GLUCOSE BLD-MCNC: 113 MG/DL (ref 70–99)
MAGNESIUM SERPL-MCNC: 2.1 MG/DL (ref 1.6–2.6)
OSMOLALITY SERPL CALC.SUM OF ELEC: 289 MOSM/KG (ref 275–295)
PHOSPHATE SERPL-MCNC: 4 MG/DL (ref 2.5–4.9)
POTASSIUM SERPL-SCNC: 3.7 MMOL/L (ref 3.5–5.1)
PTH-INTACT SERPL-MCNC: 72.5 PG/ML (ref 18.5–88)
SODIUM SERPL-SCNC: 138 MMOL/L (ref 136–145)
VIT D+METAB SERPL-MCNC: 73.1 NG/ML (ref 30–100)

## 2023-10-05 PROCEDURE — 83036 HEMOGLOBIN GLYCOSYLATED A1C: CPT

## 2023-10-05 PROCEDURE — 82306 VITAMIN D 25 HYDROXY: CPT

## 2023-10-05 PROCEDURE — 73130 X-RAY EXAM OF HAND: CPT | Performed by: INTERNAL MEDICINE

## 2023-10-05 PROCEDURE — 36415 COLL VENOUS BLD VENIPUNCTURE: CPT

## 2023-10-05 PROCEDURE — 84100 ASSAY OF PHOSPHORUS: CPT

## 2023-10-05 PROCEDURE — 83735 ASSAY OF MAGNESIUM: CPT

## 2023-10-05 PROCEDURE — 80048 BASIC METABOLIC PNL TOTAL CA: CPT

## 2023-10-05 PROCEDURE — 83970 ASSAY OF PARATHORMONE: CPT

## 2023-10-05 PROCEDURE — 82728 ASSAY OF FERRITIN: CPT

## 2023-10-06 LAB
EST. AVERAGE GLUCOSE BLD GHB EST-MCNC: 114 MG/DL (ref 68–126)
HBA1C MFR BLD: 5.6 % (ref ?–5.7)

## 2023-11-02 NOTE — CONSULTS
New Patient Consultation    Chief Complaint: dog bite to the lip    History of Present Illness:   Celio Hernandez is a 49year old female referred by [facility].  She sustained a dog bite this morning at 730 am, when she bent down to kiss h cataracts, glaucoma, nasal congestion, nosebleed, hoarseness, sore throat, or swollen glands  Respiratory:   The patient denies shortness of breath, cough, bloody cough, phlegm, asthma, or wheezing  Cardiovascular:   The patient denies chest pain/pressure, oriented and well-developed. Neurologic: Speech patterns and movements are normal.     Psychiatric: Affect is appropriate. Eyes: Conjunctiva are clear, non-icteric.      ENT: no obvious abnormality    Integument/Skin: There is a laceration (dog bite) carrie    Surgeon: Dr. Gianna Jama  Assistant: Ajay SALAMANCA    The patient was placed in a supine position and the wound was irrigated with saline.  Then 1% lidocaine with epinephrine was used to anesthetize the upper lip and oral mucosa.  Then all wounds were Azelaic Acid Counseling: Patient counseled that medicine may cause skin irritation and to avoid applying near the eyes.  In the event of skin irritation, the patient was advised to reduce the amount of the drug applied or use it less frequently.   The patient verbalized understanding of the proper use and possible adverse effects of azelaic acid.  All of the patient's questions and concerns were addressed.

## 2023-11-14 DIAGNOSIS — M81.0 AGE-RELATED OSTEOPOROSIS WITHOUT CURRENT PATHOLOGICAL FRACTURE: ICD-10-CM

## 2023-11-14 DIAGNOSIS — E55.9 VITAMIN D DEFICIENCY: ICD-10-CM

## 2023-11-14 RX ORDER — IBANDRONATE SODIUM 150 MG/1
150 TABLET, FILM COATED ORAL
Qty: 3 TABLET | Refills: 1 | Status: SHIPPED | OUTPATIENT
Start: 2023-11-14

## 2023-11-14 NOTE — TELEPHONE ENCOUNTER
LOV: 10/04/2023  Future Appointments   Date Time Provider Jasbir Paige   4/17/2024  9:00 AM Fransisca Li,  [facility]   LF:  10/04/2023    QTY:   3    Refills:    1  LABS:   Component      Latest Ref Rng 10/5/2023   Glucose      70 - 99 mg/dL 113 (H)    Sodium      136 - 145 mmol/L 138    Potassium      3.5 - 5.1 mmol/L 3.7    Chloride      98 - 112 mmol/L 104    Carbon Dioxide, Total      21.0 - 32.0 mmol/L 28.0    ANION GAP      0 - 18 mmol/L 6    BUN      7 - 18 mg/dL 18    CREATININE      0.55 - 1.02 mg/dL 0.81    CALCIUM      8.5 - 10.1 mg/dL 9.2    CALCULATED OSMOLALITY      275 - 295 mOsm/kg 289    EGFR      >=60 mL/min/1.73m2 83    Patient Fasting for BMP?  No       Legend:  (H) High

## 2023-12-22 ENCOUNTER — TELEPHONE (OUTPATIENT)
Dept: FAMILY MEDICINE CLINIC | Facility: CLINIC | Age: 63
End: 2023-12-22

## 2023-12-22 NOTE — TELEPHONE ENCOUNTER
----- Message -----   From: Shelia Barry   Sent: 12/21/2023   5:57 PM CST   To: [facility]   Subject: Appointment scheduled from [employer] For: Grady Christineronaldo ([id number])   Visit Type: MYCHART EXAM (2964)      2/21/2024   10:00 AM  30 mins. Leena Corrigan              [facility] [address]      Patient Comments:   Irregular heart beat   Patient called - left message to call back.

## 2023-12-22 NOTE — TELEPHONE ENCOUNTER
Spoke to patient - she states she occassionally feels a flutter in chest, denies any chest pain. Dr. Karla Perry heard extra beats when listening to her heart. Appt given for 1/4/24.

## 2023-12-31 NOTE — PROGRESS NOTES
HPI:   Mary Ann Garcia is a 63 year old female who presents with heart rhythm concerns     Pt has noted some heart palpitations - associated with some chest pressure   No dizziness or lightheadedness   No SOB   Dad CAD   Will last a few seconds  3 x per day   Started in August   No change with stopping coffee     Some chest pressure when moving furniture  Exercising without difficulty       Current Outpatient Medications   Medication Sig Dispense Refill    Ibandronate Sodium 150 MG Oral Tab Take 1 tablet (150 mg total) by mouth every 30 (thirty) days. 3 tablet 1    NON FORMULARY [employer] Calcium Bone Strength contains:  D3: 25 mcg  K1: 35 mcg  K2: 45 mcg  Calcium: 905 mg  Magnesium: 62 mg      NON FORMULARY [employer] Multi Collagen Powder containing:   Calcium 53 mg      Cholecalciferol 125 MCG (5000 UT) Oral Tab Take 1 tablet (5,000 Units total) by mouth daily.      Multiple Vitamins-Minerals (MULTI VITAMIN/MINERALS) Oral Tab Take by mouth. Contains 500 mg calcium      magnesium 250 MG Oral Tab Take 1 tablet (250 mg total) by mouth.        Past Medical History:   Diagnosis Date    Osteoporosis       Past Surgical History:   Procedure Laterality Date    BREAST SURGERY      TRIGGER FINGER RELEASE  01/2021    right       Family History   Problem Relation Age of Onset    Breast Cancer Mother 77    Breast Cancer Sister 55    Ovarian Cancer Paternal Aunt 50        In her 50's.      Social History:   Social History     Socioeconomic History    Marital status:    Tobacco Use    Smoking status: Never    Smokeless tobacco: Never   Vaping Use    Vaping Use: Never used   Substance and Sexual Activity    Alcohol use: No    Drug use: No     Occ: . : . Children: .   MFM for DMG  Exercise:  7 times per week.  Diet: watches calories closely     REVIEW OF SYSTEMS:   GENERAL: feels well otherwise  SKIN: denies any unusual skin lesions  EYES:denies blurred vision or double vision  HEENT: denies nasal  congestion, sinus pain or ST  LUNGS: denies shortness of breath with exertion  CARDIOVASCULAR: denies chest pain on exertion  GI: denies abdominal pain,denies heartburn  NEURO: denies headaches  PSYCHE: denies depression or anxiety  HEMATOLOGIC: denies hx of anemia  ENDOCRINE: denies thyroid history  ALL/ASTHMA: denies hx of allergy or asthma    EXAM:   /70   Pulse 60   Resp 20   Ht 5' 6\" (1.676 m)   Wt 136 lb (61.7 kg)   SpO2 99%   BMI 21.95 kg/m²   Body mass index is 21.95 kg/m².   GENERAL: alert and oriented X 3, well developed, well nourished,in no apparent distress  CARDIO: RRR with 3/6 murmur  LUNGS: clear to auscultation  NECK: supple,no adenopathy,no thyromegaly  HEENT: atraumatic, normocephalic,ears and throat are clear  EYES:PERRLA, EOMI, normal,conjunctiva are clear  SKIN: norashes,no suspicious lesions  EXTREMITIES: no cyanosis, clubbing or edema  NEURO: cranial nerves are intact,motor and sensory are grossly intact    ASSESSMENT AND PLAN:   Mary Ann Garcia is a 63 year old female who presents with     1. Palpitations    - Free T4, (Free Thyroxine)  - Assay, Thyroid Stim Hormone  - Magnesium [E]  - Comp Metabolic Panel (14) [E]  - EKG 12 Lead performed at [facility]; Future  - CARD MONITOR HOLTER 48 HOUR (CPT=93225); Future  - CARD ECHO STRESS ECHO/REST AND STRESS(CPT=93350/86262 [facility] 38503); Future    2. Chest pain, unspecified type    - Free T4, (Free Thyroxine)  - Assay, Thyroid Stim Hormone  - Magnesium [E]  - Comp Metabolic Panel (14) [E]  - EKG 12 Lead performed at [facility]; Future  - CARD MONITOR HOLTER 48 HOUR (CPT=93225); Future  - CARD ECHO STRESS ECHO/REST AND STRESS(CPT=93350/47013 [facility] 94897); Future    3. Family history of early CAD    - Free T4, (Free Thyroxine)  - Assay, Thyroid Stim Hormone  - Magnesium [E]  - Comp Metabolic Panel (14) [E]  - EKG 12 Lead performed at [facility]; Future  - CARD MONITOR HOLTER 48 HOUR (CPT=93225); Future  - CARD ECHO STRESS  ECHO/REST AND STRESS(CPT=93350/91646 [facility] [address]); Future    4. Newly recognized heart murmur    - CARD ECHO 2D DOPPLER (CPT=93306); Future         Questions answered and patient indicates understanding of these issues and agrees to the plan.  Follow up in 1 mo or sooner if needed .

## 2024-01-04 ENCOUNTER — LAB ENCOUNTER (OUTPATIENT)
Dept: LAB | Age: 64
End: 2024-01-04
Attending: FAMILY MEDICINE
Payer: COMMERCIAL

## 2024-01-04 ENCOUNTER — OFFICE VISIT (OUTPATIENT)
Dept: FAMILY MEDICINE CLINIC | Facility: CLINIC | Age: 64
End: 2024-01-04
Payer: COMMERCIAL

## 2024-01-04 ENCOUNTER — EKG ENCOUNTER (OUTPATIENT)
Dept: LAB | Age: 64
End: 2024-01-04
Attending: FAMILY MEDICINE
Payer: COMMERCIAL

## 2024-01-04 VITALS
BODY MASS INDEX: 21.86 KG/M2 | HEIGHT: 66 IN | HEART RATE: 60 BPM | SYSTOLIC BLOOD PRESSURE: 102 MMHG | DIASTOLIC BLOOD PRESSURE: 70 MMHG | WEIGHT: 136 LBS | RESPIRATION RATE: 20 BRPM | OXYGEN SATURATION: 99 %

## 2024-01-04 DIAGNOSIS — Z82.49 FAMILY HISTORY OF EARLY CAD: ICD-10-CM

## 2024-01-04 DIAGNOSIS — R00.2 PALPITATIONS: Primary | ICD-10-CM

## 2024-01-04 DIAGNOSIS — R07.9 CHEST PAIN, UNSPECIFIED TYPE: ICD-10-CM

## 2024-01-04 DIAGNOSIS — R00.2 PALPITATIONS: ICD-10-CM

## 2024-01-04 DIAGNOSIS — R01.1 NEWLY RECOGNIZED HEART MURMUR: ICD-10-CM

## 2024-01-04 LAB
ALBUMIN SERPL-MCNC: 4.1 G/DL (ref 3.4–5)
ALBUMIN/GLOB SERPL: 1.5 {RATIO} (ref 1–2)
ALP LIVER SERPL-CCNC: 81 U/L
ALT SERPL-CCNC: 24 U/L
ANION GAP SERPL CALC-SCNC: 8 MMOL/L (ref 0–18)
AST SERPL-CCNC: 20 U/L (ref 15–37)
ATRIAL RATE: 64 BPM
BILIRUB SERPL-MCNC: 0.3 MG/DL (ref 0.1–2)
BUN BLD-MCNC: 24 MG/DL (ref 9–23)
CALCIUM BLD-MCNC: 9.6 MG/DL (ref 8.5–10.1)
CHLORIDE SERPL-SCNC: 105 MMOL/L (ref 98–112)
CO2 SERPL-SCNC: 28 MMOL/L (ref 21–32)
CREAT BLD-MCNC: 0.64 MG/DL
EGFRCR SERPLBLD CKD-EPI 2021: 100 ML/MIN/1.73M2 (ref 60–?)
FASTING STATUS PATIENT QL REPORTED: NO
GLOBULIN PLAS-MCNC: 2.8 G/DL (ref 2.8–4.4)
GLUCOSE BLD-MCNC: 94 MG/DL (ref 70–99)
MAGNESIUM SERPL-MCNC: 2.1 MG/DL (ref 1.6–2.6)
OSMOLALITY SERPL CALC.SUM OF ELEC: 296 MOSM/KG (ref 275–295)
P AXIS: 61 DEGREES
P-R INTERVAL: 166 MS
POTASSIUM SERPL-SCNC: 3.6 MMOL/L (ref 3.5–5.1)
PROT SERPL-MCNC: 6.9 G/DL (ref 6.4–8.2)
Q-T INTERVAL: 414 MS
QRS DURATION: 88 MS
QTC CALCULATION (BEZET): 427 MS
R AXIS: 25 DEGREES
SODIUM SERPL-SCNC: 141 MMOL/L (ref 136–145)
T AXIS: 53 DEGREES
T4 FREE SERPL-MCNC: 1 NG/DL (ref 0.8–1.7)
TSI SER-ACNC: 1.19 MIU/ML (ref 0.36–3.74)
VENTRICULAR RATE: 64 BPM

## 2024-01-04 PROCEDURE — 93010 ELECTROCARDIOGRAM REPORT: CPT | Performed by: INTERNAL MEDICINE

## 2024-01-04 PROCEDURE — 99214 OFFICE O/P EST MOD 30 MIN: CPT | Performed by: FAMILY MEDICINE

## 2024-01-04 PROCEDURE — 84443 ASSAY THYROID STIM HORMONE: CPT | Performed by: FAMILY MEDICINE

## 2024-01-04 PROCEDURE — 83735 ASSAY OF MAGNESIUM: CPT | Performed by: FAMILY MEDICINE

## 2024-01-04 PROCEDURE — 93005 ELECTROCARDIOGRAM TRACING: CPT

## 2024-01-04 PROCEDURE — 80053 COMPREHEN METABOLIC PANEL: CPT | Performed by: FAMILY MEDICINE

## 2024-01-04 PROCEDURE — 84439 ASSAY OF FREE THYROXINE: CPT | Performed by: FAMILY MEDICINE

## 2024-01-04 PROCEDURE — 3074F SYST BP LT 130 MM HG: CPT | Performed by: FAMILY MEDICINE

## 2024-01-04 PROCEDURE — 3008F BODY MASS INDEX DOCD: CPT | Performed by: FAMILY MEDICINE

## 2024-01-04 PROCEDURE — 36415 COLL VENOUS BLD VENIPUNCTURE: CPT | Performed by: FAMILY MEDICINE

## 2024-01-04 PROCEDURE — 3078F DIAST BP <80 MM HG: CPT | Performed by: FAMILY MEDICINE

## 2024-02-07 ENCOUNTER — HOSPITAL ENCOUNTER (OUTPATIENT)
Dept: CV DIAGNOSTICS | Facility: HOSPITAL | Age: 64
Discharge: HOME | End: 2024-02-07
Attending: FAMILY MEDICINE
Payer: COMMERCIAL

## 2024-02-07 DIAGNOSIS — R01.1 NEWLY RECOGNIZED HEART MURMUR: ICD-10-CM

## 2024-02-07 DIAGNOSIS — R00.2 PALPITATIONS: ICD-10-CM

## 2024-02-07 DIAGNOSIS — Z82.49 FAMILY HISTORY OF EARLY CAD: ICD-10-CM

## 2024-02-07 DIAGNOSIS — R07.9 CHEST PAIN, UNSPECIFIED TYPE: ICD-10-CM

## 2024-02-07 PROCEDURE — 93226 XTRNL ECG REC<48 HR SCAN A/R: CPT | Performed by: FAMILY MEDICINE

## 2024-02-07 PROCEDURE — 93306 TTE W/DOPPLER COMPLETE: CPT | Performed by: FAMILY MEDICINE

## 2024-02-07 PROCEDURE — 93225 XTRNL ECG REC<48 HRS REC: CPT | Performed by: FAMILY MEDICINE

## 2024-03-08 DIAGNOSIS — E55.9 VITAMIN D DEFICIENCY: ICD-10-CM

## 2024-03-08 DIAGNOSIS — M81.0 AGE-RELATED OSTEOPOROSIS WITHOUT CURRENT PATHOLOGICAL FRACTURE: ICD-10-CM

## 2024-03-11 RX ORDER — IBANDRONATE SODIUM 150 MG/1
150 TABLET, FILM COATED ORAL
Qty: 3 TABLET | Refills: 0 | Status: SHIPPED | OUTPATIENT
Start: 2024-03-11

## 2024-03-11 NOTE — TELEPHONE ENCOUNTER
Future Appointments   Date Time Provider Department Center   4/17/2024  7:00 AM Kristen Torres DO [facility] [address]   4/17/2024  9:00 AM Sarah Li DO [facility] [place]     Last office visit: 10/4/2023    Last fill: 11/14/2023 3 tab, 1 refills

## 2024-04-17 ENCOUNTER — OFFICE VISIT (OUTPATIENT)
Dept: RHEUMATOLOGY | Facility: CLINIC | Age: 64
End: 2024-04-17
Payer: COMMERCIAL

## 2024-04-17 ENCOUNTER — OFFICE VISIT (OUTPATIENT)
Dept: FAMILY MEDICINE CLINIC | Facility: CLINIC | Age: 64
End: 2024-04-17
Payer: COMMERCIAL

## 2024-04-17 VITALS
RESPIRATION RATE: 16 BRPM | BODY MASS INDEX: 21.21 KG/M2 | HEIGHT: 66 IN | DIASTOLIC BLOOD PRESSURE: 72 MMHG | SYSTOLIC BLOOD PRESSURE: 102 MMHG | WEIGHT: 132 LBS | OXYGEN SATURATION: 97 % | HEART RATE: 57 BPM

## 2024-04-17 VITALS
SYSTOLIC BLOOD PRESSURE: 104 MMHG | DIASTOLIC BLOOD PRESSURE: 66 MMHG | HEIGHT: 65 IN | TEMPERATURE: 98 F | WEIGHT: 132 LBS | BODY MASS INDEX: 21.99 KG/M2 | HEART RATE: 59 BPM | RESPIRATION RATE: 16 BRPM | OXYGEN SATURATION: 95 %

## 2024-04-17 DIAGNOSIS — R93.1 ABNORMAL ECHOCARDIOGRAM: ICD-10-CM

## 2024-04-17 DIAGNOSIS — M79.642 BILATERAL HAND PAIN: ICD-10-CM

## 2024-04-17 DIAGNOSIS — Z13.820 SCREENING FOR OSTEOPOROSIS: ICD-10-CM

## 2024-04-17 DIAGNOSIS — R06.83 SNORING: ICD-10-CM

## 2024-04-17 DIAGNOSIS — M81.0 AGE-RELATED OSTEOPOROSIS WITHOUT CURRENT PATHOLOGICAL FRACTURE: Primary | ICD-10-CM

## 2024-04-17 DIAGNOSIS — E55.9 VITAMIN D DEFICIENCY: ICD-10-CM

## 2024-04-17 DIAGNOSIS — M79.641 BILATERAL HAND PAIN: ICD-10-CM

## 2024-04-17 DIAGNOSIS — Z79.899 MEDICATION MANAGEMENT: ICD-10-CM

## 2024-04-17 DIAGNOSIS — Z12.31 ENCOUNTER FOR SCREENING MAMMOGRAM FOR HIGH-RISK PATIENT: ICD-10-CM

## 2024-04-17 DIAGNOSIS — Z00.00 ANNUAL PHYSICAL EXAM: ICD-10-CM

## 2024-04-17 DIAGNOSIS — Z01.419 WELL WOMAN EXAM WITH ROUTINE GYNECOLOGICAL EXAM: Primary | ICD-10-CM

## 2024-04-17 DIAGNOSIS — R00.2 PALPITATIONS: ICD-10-CM

## 2024-04-17 PROCEDURE — 99213 OFFICE O/P EST LOW 20 MIN: CPT | Performed by: INTERNAL MEDICINE

## 2024-04-17 PROCEDURE — 99396 PREV VISIT EST AGE 40-64: CPT | Performed by: FAMILY MEDICINE

## 2024-04-17 RX ORDER — IBANDRONATE SODIUM 150 MG/1
150 TABLET, FILM COATED ORAL
Qty: 3 TABLET | Refills: 1 | Status: SHIPPED | OUTPATIENT
Start: 2024-04-17

## 2024-04-17 RX ORDER — MELATONIN
325
COMMUNITY

## 2024-04-17 NOTE — PROGRESS NOTES
HPI:   Mary Ann Garcia is a 63 year old female who presents for a complete physical exam.     Wt Readings from Last 6 Encounters:   04/17/24 132 lb (59.9 kg)   01/04/24 136 lb (61.7 kg)   10/04/23 135 lb (61.2 kg)   03/29/23 133 lb (60.3 kg)   03/15/22 136 lb (61.7 kg)   03/08/21 139 lb (63 kg)     Body mass index is 21.31 kg/m².     Cholesterol, Total (mg/dL)   Date Value   04/03/2023 201 (H)   03/21/2022 195   04/12/2021 198     CHOLESTEROL, TOTAL (mg/dL)   Date Value   11/04/2015 220 (H)   04/18/2015 188   08/12/2013 203 (H)     HDL Cholesterol (mg/dL)   Date Value   04/03/2023 135 (H)   03/21/2022 111 (H)   04/12/2021 119 (H)     HDL CHOLESTEROL (mg/dL)   Date Value   11/04/2015 106   04/18/2015 94   08/12/2013 109     LDL Cholesterol (mg/dL)   Date Value   04/03/2023 56   03/21/2022 76   04/12/2021 71     LDL-CHOLESTEROL (mg/dL (calc))   Date Value   11/04/2015 99   04/18/2015 85   08/12/2013 81     AST (U/L)   Date Value   01/04/2024 20   04/03/2023 32   03/21/2022 36   11/04/2015 84 (H)   04/18/2015 34   08/12/2013 32     ALT (U/L)   Date Value   01/04/2024 24   04/03/2023 32   03/21/2022 30   11/04/2015 60 (H)   04/18/2015 23   08/12/2013 19       last pap 2021 - HPV negative    all previous paps normal  No vaginal discharge  No bladder dysfunction  No menses  No hot flashes or vaginal dryness   + performing self breast exams  last mammogram 2022  dexa - 2021 wearing weight vest   + calcium and vit D supplementation  family history of breast  Cancer- mom 70's sister - 50's     Colon cancer- paternal uncle  Colonoscopy - 2016      Current Outpatient Medications   Medication Sig Dispense Refill    Ibandronate Sodium 150 MG Oral Tab Take 1 tablet (150 mg total) by mouth every 30 (thirty) days. 3 tablet 0    NON FORMULARY [employer] Calcium Bone Strength contains:  D3: 25 mcg  K1: 35 mcg  K2: 45 mcg  Calcium: 905 mg  Magnesium: 62 mg      NON FORMULARY [employer] Multi Collagen Powder  containing:   Calcium 53 mg      Cholecalciferol 125 MCG (5000 UT) Oral Tab Take 1 tablet (5,000 Units total) by mouth daily.      Multiple Vitamins-Minerals (MULTI VITAMIN/MINERALS) Oral Tab Take by mouth. Contains 500 mg calcium      magnesium 250 MG Oral Tab Take 1 tablet (250 mg total) by mouth.        Past Medical History:    Osteoporosis      Past Surgical History:   Procedure Laterality Date    Breast surgery      Trigger finger release  01/2021    right       Family History   Problem Relation Age of Onset    Breast Cancer Mother 77    Breast Cancer Sister 55    Ovarian Cancer Paternal Aunt 50        In her 50's.      Social History:   Social History     Socioeconomic History    Marital status:    Tobacco Use    Smoking status: Never    Smokeless tobacco: Never   Vaping Use    Vaping status: Never Used   Substance and Sexual Activity    Alcohol use: No    Drug use: No     Occ: . : . Children: .   MFM for DMG  Exercise:  7 times per week.  Diet: watches calories closely     REVIEW OF SYSTEMS:   GENERAL: feels well otherwise  SKIN: denies any unusual skin lesions  EYES:denies blurred vision or double vision  HEENT: denies nasal congestion, sinus pain or ST  LUNGS: denies shortness of breath with exertion  CARDIOVASCULAR: denies chest pain on exertion  GI: denies abdominal pain,denies heartburn  NEURO: denies headaches  PSYCHE: denies depression or anxiety  HEMATOLOGIC: denies hx of anemia  ENDOCRINE: denies thyroid history  ALL/ASTHMA: denies hx of allergy or asthma    EXAM:   /72   Pulse 57   Resp 16   Ht 5' 6\" (1.676 m)   Wt 132 lb (59.9 kg)   SpO2 97%   BMI 21.31 kg/m²   Body mass index is 21.31 kg/m².   GENERAL: alert and oriented X 3, well developed, well nourished,in no apparent distress  CARDIO: RRR without murmur  LUNGS: clear to auscultation  NECK: supple,no adenopathy,no thyromegaly  HEENT: atraumatic, normocephalic,ears and throat are clear  EYES:PERRLA, EOMI,  normal,conjunctiva are clear  SKIN: norashes,no suspicious lesions  GI: good BS's,no masses, HSM or tenderness  CHEST: no chest tenderness  BREAST: no axillary LAD, no masses no nipple discharge  +breast implants  : external genitalia - no inguinal LAD, no lesions.  ((Speculum exam- introitus is normal,scant discharge,cervix is pink)) deferred  Bimanual exam- no adnexal masses or tenderness  ((RECTAL:good rectal tone,no mass, brown stool, stool is OB negative))   MUSCULOSKELETAL: back is not tender,FROM of the back  EXTREMITIES: no cyanosis, clubbing or edema  NEURO: cranial nerves are intact,motor and sensory are grossly intact    ASSESSMENT AND PLAN:   Mary Ann Garcia is a 63 year old female who presents with     1. Well woman exam with routine gynecological exam      2. Annual physical exam    - Vitamin D [E]; Future  - Free T4, (Free Thyroxine); Future  - Assay, Thyroid Stim Hormone; Future  - Lipid Panel; Future  - CBC With Differential With Platelet; Future  - Vitamin B12; Future  - Comp Metabolic Panel (14); Future  - Hemoglobin A1C; Future    3. Encounter for screening mammogram for high-risk patient    - [facility] KIM 2D+3D SCREENING [facility] BILAT(00581/30081); Future    4. Screening for osteoporosis    - XR DEXA BONE DENSITOMETRY (CPT=77080); Future    5. Medication management    - XR DEXA BONE DENSITOMETRY (CPT=77080); Future    6. Snoring    - OP REFERRAL TO DIAGNOSTIC SLEEP STUDY    7. Abnormal echocardiogram    - OP REFERRAL TO DIAGNOSTIC SLEEP STUDY    Discussed diet, exercise,calcium, vitamin D, fish oil and self breast exams.   Questions answered and patient indicates understanding of these issues and agrees to the plan.  Follow up in one year or sooner if needed .

## 2024-04-17 NOTE — PROGRESS NOTES
?  RHEUMATOLOGY FOLLOW UP   Date of visit: 04/17/2024  ?  Chief Complaint   Patient presents with    Osteoporosis     No recent fracture or concerns.     Follow - Up     ?  ASSESSMENT, DISCUSSION & PLAN   Assessment:  1. Age-related osteoporosis without current pathological fracture    2. Abnormal echocardiogram    3. Palpitations    4. Bilateral hand pain    5. Vitamin D deficiency        Discussion:  Ms. Mary Ann Garcia is a 62 yo woman who was previously seen for worsened diffuse joint pain in her hands. There were no signs of active synovitis on her exam but is more consistent with heberden and clotilde nodes. Her autoimmune work up at that time was negative. Recommended OTC remedies for the pain.   Recently present due to worsened osteoporosis. Seems like she has had either osteoporosis or osteopenia for the past few years. Has been doing significant weight bearing exercises and takes plenty of calcium, vitamin d, magnesium and K2, however her T scores have worsened. I worry about the Tscore of the lumbar spine but also worry about how much worse the hip tscores are even compared to 2019 when the lumbar score was worse.   She was started on ibandronate and has been tolerating for the past 6 months.   Will have her continue without changes for now.  Her height has decreased by an inch over the past 1+ year so will need to monitor closely   Updated BMD in Oct and follow up at that time  She will continue her supplements and weight bearing activity.  Of note, having some palpitations, getting worked up by PCP. Had borderline elevated RVSP- will obtain CXR to screen for ILD although lung sounds normal today.  She may benefit from repeat ECHO in a year and if still elevated, consider seeing pulm htn expert.     Due to patient's risk of osteoporotic fracture, decision was made to start oral bisphosphonate therapy. Risks of medication include but are not limited to headache, abdominal pain, GERD, nausea,  dysphagia, MSK pain, muscle cramps, and rarely osteonecrosis of the jaw. If the pt begins to experience any of these symptoms, he/she is to stop the medication and call our office immediately to discuss further. Prior to starting and during treatment, calcium, vitamin d, magnesium and phosphorus levels will need to be monitored.  Also recommended pt be seen by dentist prior to initiating medication to rule out oral infection and to have any necessary dental work performed - she had already looked into side effects of tx and actually just completed dental implant with no plan for further procedures in the near future. Recommended she continue close follow up with dentist and inform of the new medication.  She feels most comfortable with ibandronate every 30 days. But she also seems open to reclast if needed.   Hand out provided on med for pt to review as well.   Follow up in 6 months or sooner as needed.  Will repeat BMD in one year after initiating therapy to monitor progress.     Patient verbalized understanding of above instructions. No further questions at this time.    Code selection for this visit was based on time spent (25min) on date of service in preparing to see the patient, obtaining and/or reviewing separately obtained history, performing a medically appropriate examination, counseling and educating the patient/family/caregiver, ordering medications or testing, referring and communicating with other healthcare providers, documenting clinical information in the E HR, independently interpreting results and communicating results to the patient/family/caregiver and care coordination with the patient's other providers.        ?  Plan:  Diagnoses and all orders for this visit:    Age-related osteoporosis without current pathological fracture  -     Magnesium; Future  -     Phosphorus; Future  -     Ibandronate Sodium 150 MG Oral Tab; Take 1 tablet (150 mg total) by mouth every 30 (thirty) days.    Abnormal  echocardiogram  -     XR CHEST PA + LAT CHEST (CPT=71046); Future    Palpitations  -     XR CHEST PA + LAT CHEST (CPT=71046); Future    Bilateral hand pain    Vitamin D deficiency            HPI   Mary Ann Garcia is a 63 year old female with the following active problems who presents for evaluation of osteoporosis.     No hx of falls or fractures  Extensive supplements with calcium, vitamin d, K2, magnesium. Does not eat cheese or cows milk. Does each kale, spinach. Eats fish often.   Lifts weights regularly. Does elliptical 5x/week and wears weighted vest.   Has been tolerating the ibandronate.     Had some palpitations and had EKG, holter monitor and ECHO- told borderline RVSP and mild regurg. Plan for sleep study. Still has palpitations despite adjusting supplements.     Feels the joint pain is better   Denies new areas of pain.   Denies swelling of the joints   Denies hip pain, back pain or jaw pain.  Denies hx of heart attack or stroke.     HPI from initial consultation (01/07/2020)  referred for rheumatologic evaluation due to bilateral hand pain/discomfort.     Has been suffering from bilateral hand pain for several years. Has noticed some deviations of the fingers which is cause of concern for her.  Has noticed right middle finger triggering. Was evaluated by orthopedic and had steroid injection which relieved.  + morning stiffness in the joints of her hands, feels improved with the steroid injection she had for the trigger finger. Feels like it lasts for about 10 minutes, improved with activity.   + increased difficulty with opening jars as well as fine motor movements   Denies overt swelling of the joints. Denies any other significant joint pain.     Was recently diagnosed with osteoporosis, and started on calcium and vitamin d supplementation.     + intermittent left wrist pain, attributed to how she lifts weights; denies ever any swelling.   + hx of iron deficiency anemia, takes iron supplement;  did have GI workup and normal colonoscopy. States next one in another 8 years, does have family hx of colon cancer   + hx of plantar fasciitis in the past, improved with stretching did not require any further intervention.     No history of significant pain or swelling of the ankles and bones of the feet.  The patient denies hair loss, oral or nasal ulcers, photosensitive rash, elevated or scarring rashes, Raynaud's phenomenon, prior renal disease, or history of seizures. Denies hx of pericarditis or pleuritis.   No history of prior blood clot in the legs or lungs, strokes or ischemic phenomenon.  Denies nonhealing ulcers on the fingertips, trouble swallowing, or severe acid reflux.  The patient denies any history of uveitis, crampy abdominal pain, constipation, diarrhea, bloody stools,  nodular painful shin bruises, Achilles heel pain, psoriatic lesions, spooning or pitting of the nails, history of dactylitis, or pain awakening the patient from sleep.  There are no symptoms of severe dry eyes, dry mouth, or swelling of the cheeks or under the jawbone.   No fevers, chills, lymphadenopathy, night sweats, unexpected weight loss, or easy bruising or bleeding.  Denies chronic sinus infections/disease or epistaxis.  Denies chronic cough or hemoptysis. Denies obvious blood in urine.    Family hx:  Father, mother and sister with osteoarthritis. (knees and spine)   Does not seem like it was rheumatoid arthritis but unclear. No obvious hx of gout or lupus or RA that pt is aware of.      Past Medical History:  Past Medical History:    Osteoporosis       Past Surgical History:  Past Surgical History:   Procedure Laterality Date    Breast surgery      Trigger finger release  01/2021    right      Family History:  Family History   Problem Relation Age of Onset    Breast Cancer Mother 77    Breast Cancer Sister 55    Ovarian Cancer Paternal Aunt 50        In her 50's.     Social History:  Social History     Socioeconomic History     Marital status:    Tobacco Use    Smoking status: Never    Smokeless tobacco: Never   Vaping Use    Vaping status: Never Used   Substance and Sexual Activity    Alcohol use: No    Drug use: No     Medications:  Outpatient Medications Marked as Taking for the 4/17/24 encounter (Office Visit) with Sarah Li DO   Medication Sig Dispense Refill    ferrous sulfate 325 (65 FE) MG Oral Tab EC Take 1 tablet (325 mg total) by mouth daily with breakfast.      Ibandronate Sodium 150 MG Oral Tab Take 1 tablet (150 mg total) by mouth every 30 (thirty) days. 3 tablet 1    NON FORMULARY [employer] Calcium Bone Strength contains:  D3: 25 mcg  K1: 35 mcg  K2: 45 mcg  Calcium: 905 mg  Magnesium: 62 mg      NON FORMULARY [employer] Multi Collagen Powder containing:   Calcium 53 mg      Cholecalciferol 125 MCG (5000 UT) Oral Tab Take 1 tablet (5,000 Units total) by mouth daily.      Multiple Vitamins-Minerals (MULTI VITAMIN/MINERALS) Oral Tab Take by mouth. Contains 500 mg calcium      magnesium 250 MG Oral Tab Take 1 tablet (250 mg total) by mouth.       Modified Medications    Modified Medication Previous Medication    IBANDRONATE SODIUM 150 MG ORAL TAB Ibandronate Sodium 150 MG Oral Tab       Take 1 tablet (150 mg total) by mouth every 30 (thirty) days.    Take 1 tablet (150 mg total) by mouth every 30 (thirty) days.     Medications Discontinued During This Encounter   Medication Reason    Ibandronate Sodium 150 MG Oral Tab      ?  ?  Allergies:  No Known Allergies  ?  REVIEW OF SYSTEMS   ?  Review of Systems   Constitutional:  Negative for chills, fever, malaise/fatigue and weight loss.   Eyes:  Negative for pain and redness.   Respiratory:  Negative for cough, hemoptysis, shortness of breath and wheezing.    Cardiovascular:  Positive for palpitations. Negative for chest pain and leg swelling.   Gastrointestinal:  Negative for abdominal pain, blood in stool, constipation, diarrhea, heartburn and nausea.    Genitourinary:  Negative for dysuria, frequency, hematuria and urgency.   Musculoskeletal:  Positive for joint pain (improved). Negative for back pain, myalgias and neck pain.   Skin:  Negative for itching and rash.   Neurological:  Positive for weakness (in the hands, improved). Negative for dizziness, tingling and headaches.   Endo/Heme/Allergies:  Positive for environmental allergies. Does not bruise/bleed easily.   Psychiatric/Behavioral:  Negative for depression. The patient is not nervous/anxious and does not have insomnia.      PHYSICAL EXAM   Today's Vitals:  Temperature Blood Pressure Heart Rate Resp Rate SpO2   Temp: 97.6 °F (36.4 °C) BP: 104/66 Pulse: 59 Resp: 16 SpO2: 95 %   ?  Current Weight Height BMI BSA Pain   Wt Readings from Last 1 Encounters:   04/17/24 132 lb (59.9 kg)    Height: 5' 5\" (165.1 cm) Body mass index is 21.97 kg/m². Body surface area is 1.66 meters squared. Pain Score: 0 - (None)       Physical Exam  Vitals and nursing note reviewed.   Constitutional:       General: She is not in acute distress.     Appearance: Normal appearance. She is well-developed. She is not diaphoretic.   HENT:      Head: Normocephalic.   Eyes:      General: No scleral icterus.     Extraocular Movements: Extraocular movements intact.      Conjunctiva/sclera: Conjunctivae normal.   Neck:      Vascular: No JVD.      Trachea: No tracheal deviation.   Cardiovascular:      Rate and Rhythm: Normal rate and regular rhythm.      Heart sounds: Normal heart sounds. No murmur heard.  Pulmonary:      Effort: Pulmonary effort is normal. No respiratory distress.      Breath sounds: Normal breath sounds. No wheezing.   Musculoskeletal:         General: Deformity present. No swelling or tenderness.      Cervical back: Neck supple.      Comments: Diffuse heberden and clotilde nodes of the fingers, no basilar joint tenderness of the 1st CMC bilaterally but squaring present.   No swelling, tenderness, redness or restriction of  motion of the MCPs, wrists, elbows, ankles, or joints of the feet.  Bilateral shoulders with full ROM.  SI joints non-tender. No spinous process tenderness. Slight scoliosis and thoracic kyphosis noted No lateral hip tenderness  Bilateral knees without medial joint line tenderness, no crepitus, no effusion.   Lymphadenopathy:      Cervical: No cervical adenopathy.   Skin:     General: Skin is warm and dry.      Findings: No erythema or rash.      Comments: No malar rash  No periungal erythema   Neurological:      Mental Status: She is alert and oriented to person, place, and time.      Cranial Nerves: No cranial nerve deficit.      Gait: Gait normal.   Psychiatric:         Mood and Affect: Mood normal.         Behavior: Behavior normal.       ?  Radiology review:       Narrative   PROCEDURE:  XR HAND (MIN 3 VIEWS), LEFT (CPT=73130)     TECHNIQUE:  Three views of the left hand were obtained.     COMPARISON:  None.     INDICATIONS:  M79.642 Bilateral hand pain M79.641 Bilateral hand pain     PATIENT STATED HISTORY: (As transcribed by Technologist)  Patient states she has bilateral hand pain.         FINDINGS:  Mild osteoarthritic changes are likely present at the interphalangeal joints with joint space narrowing.  No erosions are seen.  No acute fracture or dislocation is seen.         Impression   CONCLUSION:  See above.        LOCATION:  [place]        Dictated by (CST): Abdirizak Quintana MD on 10/05/2023 at 4:12 PM      Finalized by (CST): Abdirizak Quintana MD on 10/05/2023 at 4:12 PM       Narrative   PROCEDURE:  XR HAND (MIN 3 VIEWS), RIGHT (CPT=73130)     TECHNIQUE:  Three views of the right hand were obtained.     COMPARISON:  None.     INDICATIONS:  M79.642 Bilateral hand pain M79.641 Bilateral hand pain     PATIENT STATED HISTORY: (As transcribed by Technologist)  Patient states she has bilateral hand pain.         FINDINGS:  No acute fracture or dislocation is seen.  Mild osteoarthritic changes are present with diffuse  interphalangeal joint space narrowing.  No erosions are seen.  If clinical symptoms persist then consider follow-up imaging.        Impression   CONCLUSION:  See above.        LOCATION:  [facility]        Dictated by (CST): Abdirizak Quintana MD on 10/05/2023 at 4:10 PM      Finalized by (CST): Abdirizak Quintana MD on 10/05/2023 at 4:11 PM       Narrative   PROCEDURE:  XR DEXA BONE DENSITOMETRY (CPT=77080)     LOCATION:                                           COMPARISON:  MITZI KAHN, XR DEXA BONE DENSITOMETRY (CPT=77080), 4/07/2021, 11:39 AM.     INDICATIONS:    Z13.820 Screening for osteoporosis     PATIENT STATED HISTORY: (As transcribed by Technologist)  Screening for osteoporosis          LUMBAR SPINE ANALYSIS RESULTS:      Bone mineral density (BMD) (g/cm2):  0.777    Lumbar T-Score:  -2.5      % young normals:  74      % age matched controls:  88      Change from prior spine examination:  -3.0%.  This is statistically significant.              TOTAL HIP ANALYSIS RESULTS:        Bone mineral density (BMD) (g/cm2):  0.655      Total Hip T-Score:  -2.4      % young normals:  70      % age matched controls:  80      Change from prior hip examination:  -1.9%              FEMORAL NECK ANALYSIS RESULTS:        Bone mineral density (BMD) (g/cm2):  0.583      Femoral neck T-Score:  -2.4      % young normals:  69      % age matched controls:  83      Change from prior hip examination:  -2.8%            ADDITIONAL FINDINGS:  No significant additional findings.        Impression   CONCLUSION:    1. Bone mineral density values are compatible with the diagnosis of osteoporosis by WHO definition (T score less than -2.5). If therapy is initiated, a follow-up study in 1 year may aid in evaluation of therapeutic efficacy.  2. Statistically significant interval decrease in bone density of the lumbar spine.               The World Health Organization has defined the following categories based on bone density:  Normal bone:  T-score better than  -1  Osteopenia: T-score between -1 and -2.5  Osteoporosis:  T-score less than -2.5           Dictated by (CST): Saturnino Thomason MD on 4/12/2023 at 6:01 PM      Finalized by (CST): Saturnino Thomason MD on 4/12/2023 at 6:02 PM      PROCEDURE:  XR FINGER(S) (MIN 2 VIEWS), RIGHT 3RD (CPT=73140)     INDICATIONS:  M79.644 Pain in right finger(s)     COMPARISON:  None.     TECHNIQUE:  Three views of the finger were obtained.     PATIENT STATED HISTORY: (As transcribed by Technologist)  Patient has pain in her right 3rd finger for about 4 months. She also has trigger finger and she hit the MCP joint on this finger 1.5 weeks ago.      FINDINGS:  No evidence of acute displaced fracture or dislocation.  Normal mineralization.  Unremarkable soft tissues.  Mild osteoarthritic changes in the DIP joint.     =====  CONCLUSION:  No evidence of acute displaced fracture or dislocation in the right 3rd digit.       Dictated by: Artur Dickerson MD on 11/18/2019 at 8:02        PROCEDURE:  XR DEXA BONE DENSITOMETRY (CPT=77080)     COMPARISON:  [facility] XR DEXA BONE DENSITOMETRY (CPT=77080), 11/25/2014, 9:58.     INDICATIONS:    Z13.820 Encounter for screening for osteoporosis     PATIENT STATED HISTORY: (As transcribed by Technologist)  Screening for osteoporosis.           LUMBAR SPINE ANALYSIS RESULTS:      Bone mineral density (BMD) (g/cm2):  0.759    Lumbar T-Score:  -2.6      % young normals:  72      % age matched controls:  83      Change from prior spine examination:  -7.2%.  This is statistically significant.               TOTAL HIP ANALYSIS RESULTS:        Bone mineral density (BMD) (g/cm2):  0.673      Total Hip T-Score:  -2.2      % young normals:  71      % age matched controls:  79      Change from prior hip examination:  -5.3%.  This is statistically significant.               FEMORAL NECK ANALYSIS RESULTS:        Bone mineral density (BMD) (g/cm2):  0.636      Femoral neck T-Score:  -1.9      % young normals:  75      % age matched  controls:  88      Change from prior hip examination:  -1.4%            ADDITIONAL FINDINGS:  No significant additional findings.       =====  CONCLUSION:    1. Bone mineral density values are compatible with the diagnosis of osteoporosis by WHO definition (T score less than -2.5). If therapy is initiated, a follow-up study in 1 year may aid in evaluation of therapeutic efficacy.   2. Statistically significant interval decrease in bone density of the lumbar spine and hip.         Labs:  Lab Results   Component Value Date    WBC 4.5 04/03/2023    RBC 4.21 04/03/2023    HGB 13.7 04/03/2023    HCT 40.9 04/03/2023    .0 04/03/2023    MCV 97.1 04/03/2023    MCH 32.5 04/03/2023    MCHC 33.5 04/03/2023    RDW 12.4 04/03/2023    NEPRELIM 2.48 04/03/2023    NEPERCENT 55.8 04/03/2023    LYPERCENT 29.4 04/03/2023    MOPERCENT 11.5 04/03/2023    EOPERCENT 2.2 04/03/2023    BAPERCENT 0.9 04/03/2023    NE 2.48 04/03/2023    LYMABS 1.31 04/03/2023    MOABSO 0.51 04/03/2023    EOABSO 0.10 04/03/2023    BAABSO 0.04 04/03/2023     Lab Results   Component Value Date    GLU 94 01/04/2024    BUN 24 (H) 01/04/2024    BUNCREA 21.5 (H) 04/12/2021    CREATSERUM 0.64 01/04/2024    ANIONGAP 8 01/04/2024    GFR 94 11/16/2016    GFRNAA 99 03/21/2022    GFRAA 114 03/21/2022    CA 9.6 01/04/2024    OSMOCALC 296 (H) 01/04/2024    ALKPHO 81 01/04/2024    AST 20 01/04/2024    ALT 24 01/04/2024    BILT 0.3 01/04/2024    TP 6.9 01/04/2024    ALB 4.1 01/04/2024    GLOBULIN 2.8 01/04/2024    AGRATIO 1.4 11/04/2015     01/04/2024    K 3.6 01/04/2024     01/04/2024    CO2 28.0 01/04/2024     ECHO 02/2024  Conclusions:     1. Left ventricle: The cavity size was normal. Wall thickness was normal.      Systolic function was normal. The estimated ejection fraction was 60-65%,      by visual assessment. No diagnostic evidence for regional wall motion      abnormalities. Left ventricular diastolic function parameters were      normal.   2.  Right ventricle: Systolic function was normal. Systolic pressure was      increased.   3. Left atrium: The left atrial volume was mildly to moderately increased.   4. Right atrium: The atrium was mildly to moderately dilated.   5. Aortic arch: The aortic arch was 3.0cm diameter.   6. Mitral valve: There was mild regurgitation.   7. Pulmonary arteries: Systolic pressure was mildly increased, in the range      of 35mm Hg to 40mm Hg.   Impressions:  No previous study was available for comparison.       Additional Labs:  10/2023  Vitamin D 73.1 normal  Calcium 9.2 normal  PTH intact normal  Phos 4.0 normal  Mag 2.1 normal    04/2023  Ca 9.6  Iron studies normal  A1c 5.7 borderline  TSH/free t4 normal  B12 915 normal  Vit D 68.7 normal     03/2020  RF negative  CCP negative  ESR 20 normal  CRP normal     Sarah Li DO  [facility]  04/17/2024

## 2024-04-19 LAB — HPV MRNA E6/E7: NOT DETECTED

## 2024-04-29 ENCOUNTER — HOSPITAL ENCOUNTER (OUTPATIENT)
Dept: GENERAL RADIOLOGY | Facility: HOSPITAL | Age: 64
Discharge: HOME | End: 2024-04-29
Attending: INTERNAL MEDICINE
Payer: COMMERCIAL

## 2024-04-29 DIAGNOSIS — R93.1 ABNORMAL ECHOCARDIOGRAM: ICD-10-CM

## 2024-04-29 DIAGNOSIS — R00.2 PALPITATIONS: ICD-10-CM

## 2024-04-29 PROCEDURE — 71046 X-RAY EXAM CHEST 2 VIEWS: CPT | Performed by: INTERNAL MEDICINE

## 2024-05-24 ENCOUNTER — LAB ENCOUNTER (OUTPATIENT)
Dept: LAB | Age: 64
End: 2024-05-24
Attending: INTERNAL MEDICINE

## 2024-05-24 DIAGNOSIS — Z00.00 ANNUAL PHYSICAL EXAM: ICD-10-CM

## 2024-05-24 DIAGNOSIS — M81.0 AGE-RELATED OSTEOPOROSIS WITHOUT CURRENT PATHOLOGICAL FRACTURE: ICD-10-CM

## 2024-05-24 LAB
ALBUMIN SERPL-MCNC: 4.2 G/DL (ref 3.4–5)
ALBUMIN/GLOB SERPL: 1.4 {RATIO} (ref 1–2)
ALP LIVER SERPL-CCNC: 101 U/L
ALT SERPL-CCNC: 22 U/L
ANION GAP SERPL CALC-SCNC: 5 MMOL/L (ref 0–18)
AST SERPL-CCNC: 31 U/L (ref 15–37)
BASOPHILS # BLD AUTO: 0.05 X10(3) UL (ref 0–0.2)
BASOPHILS NFR BLD AUTO: 0.9 %
BILIRUB SERPL-MCNC: 0.7 MG/DL (ref 0.1–2)
BUN BLD-MCNC: 18 MG/DL (ref 9–23)
CALCIUM BLD-MCNC: 9.4 MG/DL (ref 8.5–10.1)
CHLORIDE SERPL-SCNC: 104 MMOL/L (ref 98–112)
CHOLEST SERPL-MCNC: 199 MG/DL (ref ?–200)
CO2 SERPL-SCNC: 27 MMOL/L (ref 21–32)
CREAT BLD-MCNC: 0.61 MG/DL
EGFRCR SERPLBLD CKD-EPI 2021: 101 ML/MIN/1.73M2 (ref 60–?)
EOSINOPHIL # BLD AUTO: 0.19 X10(3) UL (ref 0–0.7)
EOSINOPHIL NFR BLD AUTO: 3.6 %
ERYTHROCYTE [DISTWIDTH] IN BLOOD BY AUTOMATED COUNT: 11.6 %
EST. AVERAGE GLUCOSE BLD GHB EST-MCNC: 117 MG/DL (ref 68–126)
FASTING PATIENT LIPID ANSWER: YES
FASTING STATUS PATIENT QL REPORTED: YES
GLOBULIN PLAS-MCNC: 3.1 G/DL (ref 2.8–4.4)
GLUCOSE BLD-MCNC: 89 MG/DL (ref 70–99)
HBA1C MFR BLD: 5.7 % (ref ?–5.7)
HCT VFR BLD AUTO: 44.3 %
HDLC SERPL-MCNC: 95 MG/DL (ref 40–59)
HGB BLD-MCNC: 15 G/DL
IMM GRANULOCYTES # BLD AUTO: 0.01 X10(3) UL (ref 0–1)
IMM GRANULOCYTES NFR BLD: 0.2 %
LDLC SERPL CALC-MCNC: 90 MG/DL (ref ?–100)
LYMPHOCYTES # BLD AUTO: 1.47 X10(3) UL (ref 1–4)
LYMPHOCYTES NFR BLD AUTO: 27.8 %
MAGNESIUM SERPL-MCNC: 2.4 MG/DL (ref 1.6–2.6)
MCH RBC QN AUTO: 32.1 PG (ref 26–34)
MCHC RBC AUTO-ENTMCNC: 33.9 G/DL (ref 31–37)
MCV RBC AUTO: 94.9 FL
MONOCYTES # BLD AUTO: 0.54 X10(3) UL (ref 0.1–1)
MONOCYTES NFR BLD AUTO: 10.2 %
NEUTROPHILS # BLD AUTO: 3.03 X10 (3) UL (ref 1.5–7.7)
NEUTROPHILS # BLD AUTO: 3.03 X10(3) UL (ref 1.5–7.7)
NEUTROPHILS NFR BLD AUTO: 57.3 %
NONHDLC SERPL-MCNC: 104 MG/DL (ref ?–130)
OSMOLALITY SERPL CALC.SUM OF ELEC: 283 MOSM/KG (ref 275–295)
PHOSPHATE SERPL-MCNC: 3.5 MG/DL (ref 2.5–4.9)
PLATELET # BLD AUTO: 194 10(3)UL (ref 150–450)
POTASSIUM SERPL-SCNC: 4 MMOL/L (ref 3.5–5.1)
PROT SERPL-MCNC: 7.3 G/DL (ref 6.4–8.2)
RBC # BLD AUTO: 4.67 X10(6)UL
SODIUM SERPL-SCNC: 136 MMOL/L (ref 136–145)
T4 FREE SERPL-MCNC: 1.1 NG/DL (ref 0.8–1.7)
TRIGL SERPL-MCNC: 81 MG/DL (ref 30–149)
TSI SER-ACNC: 1.74 MIU/ML (ref 0.36–3.74)
VIT B12 SERPL-MCNC: 1035 PG/ML (ref 193–986)
VIT D+METAB SERPL-MCNC: 83.2 NG/ML (ref 30–100)
VLDLC SERPL CALC-MCNC: 13 MG/DL (ref 0–30)
WBC # BLD AUTO: 5.3 X10(3) UL (ref 4–11)

## 2024-05-24 PROCEDURE — 82306 VITAMIN D 25 HYDROXY: CPT

## 2024-05-24 PROCEDURE — 84443 ASSAY THYROID STIM HORMONE: CPT

## 2024-05-24 PROCEDURE — 80061 LIPID PANEL: CPT

## 2024-05-24 PROCEDURE — 80053 COMPREHEN METABOLIC PANEL: CPT

## 2024-05-24 PROCEDURE — 82607 VITAMIN B-12: CPT

## 2024-05-24 PROCEDURE — 83036 HEMOGLOBIN GLYCOSYLATED A1C: CPT

## 2024-05-24 PROCEDURE — 36415 COLL VENOUS BLD VENIPUNCTURE: CPT

## 2024-05-24 PROCEDURE — 84100 ASSAY OF PHOSPHORUS: CPT

## 2024-05-24 PROCEDURE — 85025 COMPLETE CBC W/AUTO DIFF WBC: CPT

## 2024-05-24 PROCEDURE — 83735 ASSAY OF MAGNESIUM: CPT

## 2024-05-24 PROCEDURE — 84439 ASSAY OF FREE THYROXINE: CPT

## 2024-05-31 ENCOUNTER — PATIENT MESSAGE (OUTPATIENT)
Dept: FAMILY MEDICINE CLINIC | Facility: CLINIC | Age: 64
End: 2024-05-31

## 2024-05-31 DIAGNOSIS — M21.612 BUNION OF LEFT FOOT: Primary | ICD-10-CM

## 2024-05-31 NOTE — TELEPHONE ENCOUNTER
Please review previous message.  Last OV 4/17/24.  Okay for referral to Dr Stevens? Please advise

## 2024-05-31 NOTE — TELEPHONE ENCOUNTER
From: Mary Ann Garcia  To: Kristen Torres  Sent: 5/31/2024 4:08 AM CDT  Subject: Name of a Podiatrist    Hi Dr. Torres,  I have a bunion on my left foot that I need to address. Could you please recommend a Podiatrist?    Thank you for your time into this matter.    Mary Ann Garcia

## 2024-07-09 ENCOUNTER — HOSPITAL ENCOUNTER (OUTPATIENT)
Dept: MAMMOGRAPHY | Facility: HOSPITAL | Age: 64
Discharge: HOME | End: 2024-07-09
Attending: FAMILY MEDICINE
Payer: COMMERCIAL

## 2024-07-09 DIAGNOSIS — Z12.31 ENCOUNTER FOR SCREENING MAMMOGRAM FOR HIGH-RISK PATIENT: ICD-10-CM

## 2024-07-09 PROCEDURE — 77063 BREAST TOMOSYNTHESIS BI: CPT | Performed by: FAMILY MEDICINE

## 2024-07-09 PROCEDURE — 77067 SCR MAMMO BI INCL CAD: CPT | Performed by: FAMILY MEDICINE

## 2024-07-30 ENCOUNTER — OFFICE VISIT (OUTPATIENT)
Dept: PODIATRY CLINIC | Facility: CLINIC | Age: 64
End: 2024-07-30
Payer: COMMERCIAL

## 2024-07-30 DIAGNOSIS — M79.672 LEFT FOOT PAIN: ICD-10-CM

## 2024-07-30 DIAGNOSIS — M81.8 OTHER OSTEOPOROSIS WITHOUT CURRENT PATHOLOGICAL FRACTURE: ICD-10-CM

## 2024-07-30 DIAGNOSIS — M20.12 HALLUX VALGUS, LEFT: Primary | ICD-10-CM

## 2024-07-30 DIAGNOSIS — M20.5X2 HALLUX LIMITUS, LEFT: ICD-10-CM

## 2024-08-04 NOTE — PROGRESS NOTES
[facility] Podiatry  Progress Note    Mary Ann Garcia is a 64 year old female.   Chief Complaint   Patient presents with    Foot Pain     Left foot bunion- patient denies pain          HPI:     Patient is a very pleasant 64-year-old female presents to clinic for evaluation of painful left foot bunion deformity.  She did have procedure performed many years ago with another podiatrist.  This was a minor procedure with the use to bur to shave down her prominent medial eminence.  She does have residual loose bodies to the inside of her joint from this procedure.  She has noticed her bunion progressed over the last several years and is interested in learning more about treatment options.  She is currently without pain but it does cause pain at times.  No other complaints are mentioned.  Past medical history, medications, and allergies reviewed.      Allergies: Patient has no known allergies.   Current Outpatient Medications   Medication Sig Dispense Refill    ferrous sulfate 325 (65 FE) MG Oral Tab EC Take 1 tablet (325 mg total) by mouth daily with breakfast.      Ibandronate Sodium 150 MG Oral Tab Take 1 tablet (150 mg total) by mouth every 30 (thirty) days. 3 tablet 1    NON FORMULARY [employer] [place] [employer] Calcium Bone Strength contains:  D3: 25 mcg  K1: 35 mcg  K2: 45 mcg  Calcium: 905 mg  Magnesium: 62 mg      NON FORMULARY [employer] Multi Collagen Powder containing:   Calcium 53 mg      Cholecalciferol 125 MCG (5000 UT) Oral Tab Take 1 tablet (5,000 Units total) by mouth daily.      Multiple Vitamins-Minerals (MULTI VITAMIN/MINERALS) Oral Tab Take by mouth. Contains 500 mg calcium      magnesium 250 MG Oral Tab Take 1 tablet (250 mg total) by mouth.        Past Medical History:    Osteoporosis      Past Surgical History:   Procedure Laterality Date    Breast surgery      Trigger finger release  01/2021    right       Family History   Problem Relation Age of Onset    Breast Cancer Mother 77    Breast  Cancer Sister 55    Ovarian Cancer Paternal Aunt 50        In her 50's.      Social History     Socioeconomic History    Marital status:    Tobacco Use    Smoking status: Never    Smokeless tobacco: Never   Vaping Use    Vaping status: Never Used   Substance and Sexual Activity    Alcohol use: No    Drug use: No           REVIEW OF SYSTEMS:     Today reviewed systems as documented below  GENERAL HEALTH: feels well otherwise  SKIN: denies any unusual skin lesions or rashes  RESPIRATORY: denies shortness of breath with exertion  CARDIOVASCULAR: denies chest pain on exertion  GI: denies abdominal pain and denies heartburn  NEURO: denies headaches  MUSCULO: Osteoporosis      EXAM:   There were no vitals taken for this visit.  GENERAL: well developed, well nourished, in no apparent distress  EXTREMITIES:   1. Integument: Normal skin temperature and turgor  2. Vascular: Dorsalis pedis two out of four bilateral and posterior tibial pulses two out of   four bilateral, capillary refill normal.   3. Musculoskeletal: All muscle groups are graded 5 out of 5 in the foot and ankle.  Medial deviation of first metatarsal and addition of hallux consistent with moderate bunion deformity.  Decreased first MPJ range of motion appreciated.  Minor pain with range of motion noted.  Compartments are soft and compressible.  No strength deficits.   4. Neurological: Normal sharp dull sensation; reflexes normal.    Left foot x-rays: 7/30/2024:    Medial deviation of first metatarsal and lateral deviation of hallux   consistent with moderate bunion deformity.  Advanced arthritic changes   noted to great toe joint evidenced by joint space narrowing, subchondral   sclerosis.  Multiple loose bodies noted to the joint medially likely form   pt's prior procedure.  No acute fractures or osseous abnormalities noted.       ASSESSMENT AND PLAN:   Diagnoses and all orders for this visit:    Hallux valgus, left  -     [facility]B POD XR - LT FOOT 3  VIEWS(AP,LAT,OBLIQUE) WT BEARING    Hallux limitus, left    Other osteoporosis without current pathological fracture    Left foot pain        Plan:    -Patient examined, chart history reviewed.  -X-rays obtained and reviewed--degenerative changes noted to great toe joint of left foot with hallux valgus deformity also appreciated.  -Discussed etiology condition and various treatment options including conservative and surgical treatment options.  -Conservatively, discussed orthotics, anti-inflammatories, cortisone injection, and activity modification.    -Surgically, would recommend first MPJ fusion.  This would address her arthritis at her joint and provide best long-term outcome.  -Surgery was discussed in detail including benefits, risks, recovery period.  -Risks include, but not limited to, continued pain, stiffness, nonunion, malunion, delayed union, paresthesias, infection, need for revision surgery.  Patient understands the risks and agrees to proceed with surgery.  -She also understands that a successful outcome will depend on period of nonweightbearing and immobilization in cam boot.  -Patient can reach out if interested in scheduling surgery or trying cortisone injection. Will continue to monitor at this time.    The patient indicates understanding of these issues and agrees to the plan.        Tim Stevens DPM    Dragon speech recognition software was used to prepare this note.  Errors in word recognition may occur.  Please contact me with any questions/concerns with this note.

## 2024-10-14 ENCOUNTER — HOSPITAL ENCOUNTER (OUTPATIENT)
Dept: BONE DENSITY | Age: 64
Discharge: HOME | End: 2024-10-14
Attending: FAMILY MEDICINE
Payer: COMMERCIAL

## 2024-10-14 DIAGNOSIS — Z79.899 MEDICATION MANAGEMENT: ICD-10-CM

## 2024-10-14 DIAGNOSIS — Z13.820 SCREENING FOR OSTEOPOROSIS: ICD-10-CM

## 2024-10-14 PROCEDURE — 77080 DXA BONE DENSITY AXIAL: CPT | Performed by: FAMILY MEDICINE

## 2024-12-05 DIAGNOSIS — M81.0 AGE-RELATED OSTEOPOROSIS WITHOUT CURRENT PATHOLOGICAL FRACTURE: ICD-10-CM

## 2024-12-05 NOTE — TELEPHONE ENCOUNTER
Future Appointments   Date Time Provider Department Center   12/18/2024 10:45 AM Sarah Li DO [facility]     Last office visit: 4/17/2024    Last fill: 4/17/2024 3 tabs, 1 refill    Will be due for next tab around the time of follow up visit.

## 2024-12-13 DIAGNOSIS — M81.0 AGE-RELATED OSTEOPOROSIS WITHOUT CURRENT PATHOLOGICAL FRACTURE: Primary | ICD-10-CM

## 2024-12-13 RX ORDER — IBANDRONATE SODIUM 150 MG/1
150 TABLET, FILM COATED ORAL
Qty: 3 TABLET | Refills: 1 | Status: SHIPPED | OUTPATIENT
Start: 2024-12-13

## 2024-12-13 NOTE — TELEPHONE ENCOUNTER
Last office visit: 4/17/24    Next Rheum Apt:12/18/2024 Sarah Li DO    Last fill: 4/17/24    Labs:   Lab Results   Component Value Date    CREATSERUM 0.61 05/24/2024    GFR 94 11/16/2016    ALKPHO 101 05/24/2024    AST 31 05/24/2024    ALT 22 05/24/2024    BILT 0.7 05/24/2024    TP 7.3 05/24/2024    ALB 4.2 05/24/2024       Lab Results   Component Value Date    WBC 5.3 05/24/2024    HGB 15.0 05/24/2024    .0 05/24/2024    NEPRELIM 3.03 05/24/2024    NEPERCENT 57.3 05/24/2024    LYPERCENT 27.8 05/24/2024    NE 3.03 05/24/2024    LYMABS 1.47 05/24/2024

## 2024-12-18 ENCOUNTER — OFFICE VISIT (OUTPATIENT)
Dept: RHEUMATOLOGY | Facility: CLINIC | Age: 64
End: 2024-12-18
Payer: COMMERCIAL

## 2024-12-18 VITALS
WEIGHT: 137 LBS | OXYGEN SATURATION: 97 % | DIASTOLIC BLOOD PRESSURE: 60 MMHG | RESPIRATION RATE: 16 BRPM | HEIGHT: 64.76 IN | SYSTOLIC BLOOD PRESSURE: 102 MMHG | TEMPERATURE: 98 F | HEART RATE: 64 BPM | BODY MASS INDEX: 23.1 KG/M2

## 2024-12-18 DIAGNOSIS — R93.1 ABNORMAL ECHOCARDIOGRAM: ICD-10-CM

## 2024-12-18 DIAGNOSIS — M79.641 BILATERAL HAND PAIN: ICD-10-CM

## 2024-12-18 DIAGNOSIS — Z79.83 LONG-TERM CURRENT USE OF BISPHOSPHONATE: ICD-10-CM

## 2024-12-18 DIAGNOSIS — E55.9 VITAMIN D DEFICIENCY: ICD-10-CM

## 2024-12-18 DIAGNOSIS — M81.0 AGE-RELATED OSTEOPOROSIS WITHOUT CURRENT PATHOLOGICAL FRACTURE: Primary | ICD-10-CM

## 2024-12-18 DIAGNOSIS — M79.642 BILATERAL HAND PAIN: ICD-10-CM

## 2024-12-18 PROCEDURE — 99213 OFFICE O/P EST LOW 20 MIN: CPT | Performed by: INTERNAL MEDICINE

## 2024-12-18 NOTE — PROGRESS NOTES
?  RHEUMATOLOGY FOLLOW UP   Date of visit: 12/18/2024  ?  Chief Complaint   Patient presents with    Osteoporosis     8 month f/u. Feeling good. No falls or fractures. No new symptoms. Converted rapid score of zero.      ?  ASSESSMENT, DISCUSSION & PLAN   Assessment:  1. Age-related osteoporosis without current pathological fracture    2. Vitamin D deficiency    3. Bilateral hand pain    4. Long-term current use of bisphosphonate    5. Abnormal echocardiogram      Discussion:  Ms. Mary Ann Garcia is a 63 yo woman who was previously seen for worsened diffuse joint pain in her hands. There were no signs of active synovitis on her exam but is more consistent with heberden and clotilde nodes. Her autoimmune work up at that time was negative. Recommended OTC remedies for the pain.   Recently present due to worsened osteoporosis. Seems like she has had either osteoporosis or osteopenia for the past few years. Has been doing significant weight bearing exercises and takes plenty of calcium, vitamin d, magnesium and K2, however her T scores have worsened. I worry about the Tscore of the lumbar spine but also worry about how much worse the hip tscores are even compared to 2019 when the lumbar score was worse.   She was started on ibandronate and has been tolerating since October 2023  Will have her continue without changes for now.  Her height previously decreased by an inch over the past 1+ year so will need to monitor closely   Updated BMD in Oct showed improvement of lumbar T-scores and stable hip T-scores.  Will plan on repeating in 2026 (2 years after most recent 1)  She will continue her supplements and weight bearing activity.  Of note, having some palpitations, getting worked up by PCP. Had borderline elevated RVSP- will repeat echo 1 year after the last one and consider seeing pulmonary hypertension specialist if still elevated.    Due to patient's risk of osteoporotic fracture, decision was made to start oral  bisphosphonate therapy. Risks of medication include but are not limited to headache, abdominal pain, GERD, nausea, dysphagia, MSK pain, muscle cramps, and rarely osteonecrosis of the jaw. If the pt begins to experience any of these symptoms, he/she is to stop the medication and call our office immediately to discuss further. Prior to starting and during treatment, calcium, vitamin d, magnesium and phosphorus levels will need to be monitored.  Also recommended pt be seen by dentist prior to initiating medication to rule out oral infection and to have any necessary dental work performed - she had already looked into side effects of tx and actually just completed dental implant with no plan for further procedures in the near future. Recommended she continue close follow up with dentist and inform of the new medication.  She feels most comfortable with ibandronate every 30 days. But she also seems open to reclast if needed.     Follow up in one year.     Patient verbalized understanding of above instructions. No further questions at this time.    Code selection for this visit was based on time spent (25min) on date of service in preparing to see the patient, obtaining and/or reviewing separately obtained history, performing a medically appropriate examination, counseling and educating the patient/family/caregiver, ordering medications or testing, referring and communicating with other healthcare providers, documenting clinical information in the E HR, independently interpreting results and communicating results to the patient/family/caregiver and care coordination with the patient's other providers.        ?  Plan:  Diagnoses and all orders for this visit:    Age-related osteoporosis without current pathological fracture  -     Magnesium; Future  -     Phosphorus; Future  -     Vitamin D; Future  -     Comp Metabolic Panel (14) [E]; Future    Vitamin D deficiency  -     Magnesium; Future  -     Phosphorus; Future  -      Vitamin D; Future  -     Comp Metabolic Panel (14) [E]; Future    Bilateral hand pain    Long-term current use of bisphosphonate  -     Magnesium; Future  -     Phosphorus; Future  -     Vitamin D; Future  -     Comp Metabolic Panel (14) [E]; Future    Abnormal echocardiogram  -     CARD ECHO 2D DOPPLER (CPT=93306); Future              HPI   Mary Ann Garcia is a 64 year old female with the following active problems who presents for evaluation of osteoporosis.     No hx of falls or fractures  Extensive supplements with calcium, vitamin d, K2, magnesium. Does not eat cheese or cows milk. Does each kale, spinach. Eats fish often.   Lifts weights regularly. Does elliptical 5x/week and wears weighted vest.   Has been tolerating the ibandronate.   (Grossly stable)     Prior palpitations has improved. Adjusted her sleep hygiene, she has been feeling better.   Prior EKG, holter monitor and ECHO- told borderline RVSP and mild regurg.     Denies new areas of pain.   Denies swelling of the joints   Denies hip pain, back pain or jaw pain.  Denies hx of heart attack or stroke.   Denies dental issues.     HPI from initial consultation (01/07/2020)  referred for rheumatologic evaluation due to bilateral hand pain/discomfort.     Has been suffering from bilateral hand pain for several years. Has noticed some deviations of the fingers which is cause of concern for her.  Has noticed right middle finger triggering. Was evaluated by orthopedic and had steroid injection which relieved.  + morning stiffness in the joints of her hands, feels improved with the steroid injection she had for the trigger finger. Feels like it lasts for about 10 minutes, improved with activity.   + increased difficulty with opening jars as well as fine motor movements   Denies overt swelling of the joints. Denies any other significant joint pain.     Was recently diagnosed with osteoporosis, and started on calcium and vitamin d supplementation.     +  intermittent left wrist pain, attributed to how she lifts weights; denies ever any swelling.   + hx of iron deficiency anemia, takes iron supplement; did have GI workup and normal colonoscopy. States next one in another 8 years, does have family hx of colon cancer   + hx of plantar fasciitis in the past, improved with stretching did not require any further intervention.     No history of significant pain or swelling of the ankles and bones of the feet.  The patient denies hair loss, oral or nasal ulcers, photosensitive rash, elevated or scarring rashes, Raynaud's phenomenon, prior renal disease, or history of seizures. Denies hx of pericarditis or pleuritis.   No history of prior blood clot in the legs or lungs, strokes or ischemic phenomenon.  Denies nonhealing ulcers on the fingertips, trouble swallowing, or severe acid reflux.  The patient denies any history of uveitis, crampy abdominal pain, constipation, diarrhea, bloody stools,  nodular painful shin bruises, Achilles heel pain, psoriatic lesions, spooning or pitting of the nails, history of dactylitis, or pain awakening the patient from sleep.  There are no symptoms of severe dry eyes, dry mouth, or swelling of the cheeks or under the jawbone.   No fevers, chills, lymphadenopathy, night sweats, unexpected weight loss, or easy bruising or bleeding.  Denies chronic sinus infections/disease or epistaxis.  Denies chronic cough or hemoptysis. Denies obvious blood in urine.    Family hx:  Father, mother and sister with osteoarthritis. (knees and spine)   Does not seem like it was rheumatoid arthritis but unclear. No obvious hx of gout or lupus or RA that pt is aware of.      Past Medical History:  Past Medical History:    Osteoporosis       Past Surgical History:  Past Surgical History:   Procedure Laterality Date    Breast surgery      Trigger finger release  01/2021    right      Family History:  Family History   Problem Relation Age of Onset    Breast Cancer  Mother 77    Breast Cancer Sister 55    Ovarian Cancer Paternal Aunt 50        In her 50's.     Social History:  Social History     Socioeconomic History    Marital status:    Tobacco Use    Smoking status: Never    Smokeless tobacco: Never   Vaping Use    Vaping status: Never Used   Substance and Sexual Activity    Alcohol use: No    Drug use: No     Medications:  Outpatient Medications Marked as Taking for the 12/18/24 encounter (Office Visit) with Sraah Li DO   Medication Sig Dispense Refill    Ibandronate Sodium 150 MG Oral Tab Take 1 tablet (150 mg total) by mouth every 30 (thirty) days. 3 tablet 1    ferrous sulfate 325 (65 FE) MG Oral Tab EC Take 1 tablet (325 mg total) by mouth daily with breakfast.      NON FORMULARY [employer] Calcium Bone Strength contains:  D3: 25 mcg  K1: 35 mcg  K2: 45 mcg  Calcium: 905 mg  Magnesium: 62 mg      NON FORMULARY [employer] Multi Collagen Powder containing:   Calcium 53 mg      Cholecalciferol 125 MCG (5000 UT) Oral Tab Take 1 tablet (5,000 Units total) by mouth daily.      Multiple Vitamins-Minerals (MULTI VITAMIN/MINERALS) Oral Tab Take by mouth. Contains 500 mg calcium      magnesium 250 MG Oral Tab Take 1 tablet (250 mg total) by mouth.       Modified Medications    No medications on file     There are no discontinued medications.    ?  ?  Allergies:  No Known Allergies  ?  REVIEW OF SYSTEMS   ?  Review of Systems   Constitutional:  Negative for chills, fever, malaise/fatigue and weight loss.   Eyes:  Negative for pain and redness.   Respiratory:  Negative for cough, hemoptysis, shortness of breath and wheezing.    Cardiovascular:  Negative for chest pain, palpitations and leg swelling.   Gastrointestinal:  Negative for abdominal pain, blood in stool, constipation, diarrhea, heartburn and nausea.   Genitourinary:  Negative for dysuria, frequency, hematuria and urgency.   Musculoskeletal:  Negative for back pain, joint pain, myalgias and neck  pain.   Skin:  Negative for itching and rash.   Neurological:  Negative for dizziness, tingling, weakness (in the hands, resolved) and headaches.   Endo/Heme/Allergies:  Positive for environmental allergies. Does not bruise/bleed easily.   Psychiatric/Behavioral:  Negative for depression. The patient is not nervous/anxious and does not have insomnia.      PHYSICAL EXAM   Today's Vitals:  Temperature Blood Pressure Heart Rate Resp Rate SpO2   Temp: 98.1 °F (36.7 °C) BP: 102/60 Pulse: 64 Resp: 16 SpO2: 97 %   ?  Current Weight Height BMI BSA Pain   Wt Readings from Last 1 Encounters:   12/18/24 137 lb (62.1 kg)    Height: 5' 4.76\" (164.5 cm) Body mass index is 22.96 kg/m². Body surface area is 1.68 meters squared.         Physical Exam  Vitals and nursing note reviewed.   Constitutional:       General: She is not in acute distress.     Appearance: Normal appearance. She is well-developed. She is not diaphoretic.   HENT:      Head: Normocephalic.   Eyes:      General: No scleral icterus.     Extraocular Movements: Extraocular movements intact.      Conjunctiva/sclera: Conjunctivae normal.   Neck:      Vascular: No JVD.      Trachea: No tracheal deviation.   Cardiovascular:      Rate and Rhythm: Normal rate and regular rhythm.      Heart sounds: Normal heart sounds. No murmur heard.  Pulmonary:      Effort: Pulmonary effort is normal. No respiratory distress.      Breath sounds: Normal breath sounds. No wheezing.   Musculoskeletal:         General: Deformity present. No swelling or tenderness.      Cervical back: Neck supple.      Comments: Diffuse heberden and clotilde nodes of the fingers, no basilar joint tenderness of the 1st CMC bilaterally but squaring present.   No swelling, tenderness, redness or restriction of motion of the MCPs, wrists, elbows, ankles, or joints of the feet.  Bilateral shoulders with full ROM.  SI joints non-tender. No spinous process tenderness. Slight scoliosis and thoracic kyphosis noted No  lateral hip tenderness  Bilateral knees without medial joint line tenderness, no crepitus, no effusion.   Lymphadenopathy:      Cervical: No cervical adenopathy.   Skin:     General: Skin is warm and dry.      Findings: No erythema or rash.      Comments: No malar rash  No periungal erythema   Neurological:      Mental Status: She is alert and oriented to person, place, and time.      Cranial Nerves: No cranial nerve deficit.      Gait: Gait normal.   Psychiatric:         Mood and Affect: Mood normal.         Behavior: Behavior normal.       ?  Radiology review:         Narrative   PROCEDURE:  XR DEXA BONE DENSITOMETRY (CPT=77080)     COMPARISON:  [facility], XR DEXA BONE DENSITOMETRY (CPT=77080), 4/12/2023, 4:51 PM.     INDICATIONS:  Z79.899 Medication management Z13.820 Screening for osteoporosis     PATIENT STATED HISTORY: (As transcribed by Technologist)  Screening for osteoporosis.          LUMBAR SPINE ANALYSIS RESULTS:      Bone mineral density (BMD) (g/cm2):  0.832    Lumbar T-Score:  -2.0      % young normals:  79      % age matched controls:  95      Change from prior spine examination:  7.1*%              TOTAL HIP ANALYSIS RESULTS:        Bone mineral density (BMD) (g/cm2):  0.662      Total Hip T-Score:  -2.3      % young normals:  70      % age matched controls:  82      Change from prior hip examination:  1.0%              FEMORAL NECK ANALYSIS RESULTS:        Bone mineral density (BMD) (g/cm2):  0.580      Femoral neck T-Score:  -2.4      % young normals:  68      % age matched controls:  83      Change from prior hip examination:  -0.6%            ADDITIONAL FINDINGS:  No significant additional findings.                     Impression   CONCLUSION:  Bone mineral density values for the lumbar spine, left total hip, and left femoral neck are compatible with the diagnosis of osteopenia by WHO definition (T score between -1.0 and -2.5).  If therapy is initiated, follow-up study is  recommended in 2  years for further evaluation of therapeutic efficacy.       Recommendation:  The National Osteoporosis Foundation (NOF) recommends pharmacological treatment for patients with a FRAX 10-year risk score of 3% or higher for a hip fracture or 20% or higher for a major osteoporotic fracture, to prevent osteoporosis  and reduce fracture risk.     The World Health Organization has defined the following categories based on bone density:  Normal bone:  T-score greater than or equal to -1  Osteopenia: T-score  less than -1 and greater  than -2.5  Osteoporosis:  T-score less than or equal -2.5        LOCATION:  [facility]              Dictated by (CST): Abdirizak Quintana MD on 10/14/2024 at 8:18 AM      Finalized by (CST): Abdirizak Quintana MD on 10/14/2024 at 8:18 AM       Narrative   PROCEDURE:  XR HAND (MIN 3 VIEWS), LEFT (CPT=73130)     TECHNIQUE:  Three views of the left hand were obtained.     COMPARISON:  None.     INDICATIONS:  M79.642 Bilateral hand pain M79.641 Bilateral hand pain     PATIENT STATED HISTORY: (As transcribed by Technologist)  Patient states she has bilateral hand pain.         FINDINGS:  Mild osteoarthritic changes are likely present at the interphalangeal joints with joint space narrowing.  No erosions are seen.  No acute fracture or dislocation is seen.         Impression   CONCLUSION:  See above.        LOCATION:  [facility]        Dictated by (CST): Abdirizak Quintana MD on 10/05/2023 at 4:12 PM      Finalized by (CST): Abdirizka Quintana MD on 10/05/2023 at 4:12 PM       Narrative   PROCEDURE:  XR HAND (MIN 3 VIEWS), RIGHT (CPT=73130)     TECHNIQUE:  Three views of the right hand were obtained.     COMPARISON:  None.     INDICATIONS:  M79.642 Bilateral hand pain M79.641 Bilateral hand pain     PATIENT STATED HISTORY: (As transcribed by Technologist)  Patient states she has bilateral hand pain.         FINDINGS:  No acute fracture or dislocation is seen.  Mild osteoarthritic changes are present with diffuse interphalangeal joint space  narrowing.  No erosions are seen.  If clinical symptoms persist then consider follow-up imaging.        Impression   CONCLUSION:  See above.        LOCATION:  [place]        Dictated by (CST): Abdirizak Quintana MD on 10/05/2023 at 4:10 PM      Finalized by (CST): Abdirizak Quintana MD on 10/05/2023 at 4:11 PM       Narrative   PROCEDURE:  XR DEXA BONE DENSITOMETRY (CPT=77080)     LOCATION:                                           COMPARISON:  [facility], XR DEXA BONE DENSITOMETRY (CPT=77080), 4/07/2021, 11:39 AM.     INDICATIONS:    Z13.820 Screening for osteoporosis     PATIENT STATED HISTORY: (As transcribed by Technologist)  Screening for osteoporosis          LUMBAR SPINE ANALYSIS RESULTS:      Bone mineral density (BMD) (g/cm2):  0.777    Lumbar T-Score:  -2.5      % young normals:  74      % age matched controls:  88      Change from prior spine examination:  -3.0%.  This is statistically significant.              TOTAL HIP ANALYSIS RESULTS:        Bone mineral density (BMD) (g/cm2):  0.655      Total Hip T-Score:  -2.4      % young normals:  70      % age matched controls:  80      Change from prior hip examination:  -1.9%              FEMORAL NECK ANALYSIS RESULTS:        Bone mineral density (BMD) (g/cm2):  0.583      Femoral neck T-Score:  -2.4      % young normals:  69      % age matched controls:  83      Change from prior hip examination:  -2.8%            ADDITIONAL FINDINGS:  No significant additional findings.        Impression   CONCLUSION:    1. Bone mineral density values are compatible with the diagnosis of osteoporosis by WHO definition (T score less than -2.5). If therapy is initiated, a follow-up study in 1 year may aid in evaluation of therapeutic efficacy.  2. Statistically significant interval decrease in bone density of the lumbar spine.               The World Health Organization has defined the following categories based on bone density:  Normal bone:  T-score better than -1  Osteopenia: T-score  between -1 and -2.5  Osteoporosis:  T-score less than -2.5           Dictated by (CST): Saturnino Thomason MD on 4/12/2023 at 6:01 PM      Finalized by (CST): Saturnino Thomason MD on 4/12/2023 at 6:02 PM      PROCEDURE:  XR FINGER(S) (MIN 2 VIEWS), RIGHT 3RD (CPT=73140)     INDICATIONS:  M79.644 Pain in right finger(s)     COMPARISON:  None.     TECHNIQUE:  Three views of the finger were obtained.     PATIENT STATED HISTORY: (As transcribed by Technologist)  Patient has pain in her right 3rd finger for about 4 months. She also has trigger finger and she hit the MCP joint on this finger 1.5 weeks ago.      FINDINGS:  No evidence of acute displaced fracture or dislocation.  Normal mineralization.  Unremarkable soft tissues.  Mild osteoarthritic changes in the DIP joint.     =====  CONCLUSION:  No evidence of acute displaced fracture or dislocation in the right 3rd digit.       Dictated by: Artur Dickerson MD on 11/18/2019 at 8:02        PROCEDURE:  XR DEXA BONE DENSITOMETRY (CPT=77080)     COMPARISON:  [facility] XR DEXA BONE DENSITOMETRY (CPT=77080), 11/25/2014, 9:58.     INDICATIONS:    Z13.820 Encounter for screening for osteoporosis     PATIENT STATED HISTORY: (As transcribed by Technologist)  Screening for osteoporosis.           LUMBAR SPINE ANALYSIS RESULTS:      Bone mineral density (BMD) (g/cm2):  0.759    Lumbar T-Score:  -2.6      % young normals:  72      % age matched controls:  83      Change from prior spine examination:  -7.2%.  This is statistically significant.               TOTAL HIP ANALYSIS RESULTS:        Bone mineral density (BMD) (g/cm2):  0.673      Total Hip T-Score:  -2.2      % young normals:  71      % age matched controls:  79      Change from prior hip examination:  -5.3%.  This is statistically significant.               FEMORAL NECK ANALYSIS RESULTS:        Bone mineral density (BMD) (g/cm2):  0.636      Femoral neck T-Score:  -1.9      % young normals:  75      % age matched controls:  88      Change  from prior hip examination:  -1.4%            ADDITIONAL FINDINGS:  No significant additional findings.       =====  CONCLUSION:    1. Bone mineral density values are compatible with the diagnosis of osteoporosis by WHO definition (T score less than -2.5). If therapy is initiated, a follow-up study in 1 year may aid in evaluation of therapeutic efficacy.   2. Statistically significant interval decrease in bone density of the lumbar spine and hip.         Labs:  Lab Results   Component Value Date    WBC 5.3 05/24/2024    RBC 4.67 05/24/2024    HGB 15.0 05/24/2024    HCT 44.3 05/24/2024    .0 05/24/2024    MCV 94.9 05/24/2024    MCH 32.1 05/24/2024    MCHC 33.9 05/24/2024    RDW 11.6 05/24/2024    NEPRELIM 3.03 05/24/2024    NEPERCENT 57.3 05/24/2024    LYPERCENT 27.8 05/24/2024    MOPERCENT 10.2 05/24/2024    EOPERCENT 3.6 05/24/2024    BAPERCENT 0.9 05/24/2024    NE 3.03 05/24/2024    LYMABS 1.47 05/24/2024    MOABSO 0.54 05/24/2024    EOABSO 0.19 05/24/2024    BAABSO 0.05 05/24/2024     Lab Results   Component Value Date    GLU 89 05/24/2024    BUN 18 05/24/2024    BUNCREA 21.5 (H) 04/12/2021    CREATSERUM 0.61 05/24/2024    ANIONGAP 5 05/24/2024    GFR 94 11/16/2016    GFRNAA 99 03/21/2022    GFRAA 114 03/21/2022    CA 9.4 05/24/2024    OSMOCALC 283 05/24/2024    ALKPHO 101 05/24/2024    AST 31 05/24/2024    ALT 22 05/24/2024    BILT 0.7 05/24/2024    TP 7.3 05/24/2024    ALB 4.2 05/24/2024    GLOBULIN 3.1 05/24/2024    AGRATIO 1.4 11/04/2015     05/24/2024    K 4.0 05/24/2024     05/24/2024    CO2 27.0 05/24/2024     ECHO 02/2024  Conclusions:     1. Left ventricle: The cavity size was normal. Wall thickness was normal.      Systolic function was normal. The estimated ejection fraction was 60-65%,      by visual assessment. No diagnostic evidence for regional wall motion      abnormalities. Left ventricular diastolic function parameters were      normal.   2. Right ventricle: Systolic function  was normal. Systolic pressure was      increased.   3. Left atrium: The left atrial volume was mildly to moderately increased.   4. Right atrium: The atrium was mildly to moderately dilated.   5. Aortic arch: The aortic arch was 3.0cm diameter.   6. Mitral valve: There was mild regurgitation.   7. Pulmonary arteries: Systolic pressure was mildly increased, in the range      of 35mm Hg to 40mm Hg.   Impressions:  No previous study was available for comparison.       Additional Labs:  05/2024  Mag normal  Phos normal  B12 1035 elevated  Vitamin D 83.2 normal  Thyroid studies normal  Calcium 9.4    10/2023  Vitamin D 73.1 normal  Calcium 9.2 normal  PTH intact normal  Phos 4.0 normal  Mag 2.1 normal    04/2023  Ca 9.6  Iron studies normal  A1c 5.7 borderline  TSH/free t4 normal  B12 915 normal  Vit D 68.7 normal     03/2020  RF negative  CCP negative  ESR 20 normal  CRP normal     Sarah Li DO  [facility]  12/18/2024

## 2024-12-19 RX ORDER — IBANDRONATE SODIUM 150 MG/1
150 TABLET, FILM COATED ORAL
Qty: 3 TABLET | Refills: 1 | OUTPATIENT
Start: 2024-12-19

## 2025-01-30 ENCOUNTER — PATIENT MESSAGE (OUTPATIENT)
Dept: PODIATRY CLINIC | Facility: CLINIC | Age: 65
End: 2025-01-30

## 2025-02-04 NOTE — TELEPHONE ENCOUNTER
S/W patient and patient states will call back at a later date to schedule surgery and pre op visit. Patient did express that she will be looking at dates in the late summer or early fall.

## 2025-03-25 ENCOUNTER — PATIENT MESSAGE (OUTPATIENT)
Dept: RHEUMATOLOGY | Facility: CLINIC | Age: 65
End: 2025-03-25

## 2025-03-25 ENCOUNTER — APPOINTMENT (OUTPATIENT)
Dept: GENERAL RADIOLOGY | Facility: HOSPITAL | Age: 65
End: 2025-03-25
Attending: EMERGENCY MEDICINE
Payer: COMMERCIAL

## 2025-03-25 ENCOUNTER — APPOINTMENT (OUTPATIENT)
Dept: CT IMAGING | Facility: HOSPITAL | Age: 65
End: 2025-03-25
Attending: EMERGENCY MEDICINE
Payer: COMMERCIAL

## 2025-03-25 ENCOUNTER — HOSPITAL ENCOUNTER (EMERGENCY)
Facility: HOSPITAL | Age: 65
Discharge: HOME | End: 2025-03-25
Attending: EMERGENCY MEDICINE
Payer: COMMERCIAL

## 2025-03-25 ENCOUNTER — PATIENT MESSAGE (OUTPATIENT)
Dept: FAMILY MEDICINE CLINIC | Facility: CLINIC | Age: 65
End: 2025-03-25

## 2025-03-25 VITALS
TEMPERATURE: 98 F | HEIGHT: 65 IN | OXYGEN SATURATION: 100 % | DIASTOLIC BLOOD PRESSURE: 82 MMHG | RESPIRATION RATE: 13 BRPM | HEART RATE: 59 BPM | WEIGHT: 128 LBS | BODY MASS INDEX: 21.33 KG/M2 | SYSTOLIC BLOOD PRESSURE: 117 MMHG

## 2025-03-25 DIAGNOSIS — K21.9 GASTROESOPHAGEAL REFLUX DISEASE, UNSPECIFIED WHETHER ESOPHAGITIS PRESENT: Primary | ICD-10-CM

## 2025-03-25 LAB
ALBUMIN SERPL-MCNC: 4.9 G/DL (ref 3.2–4.8)
ALBUMIN/GLOB SERPL: 2 {RATIO} (ref 1–2)
ALP LIVER SERPL-CCNC: 103 U/L
ALT SERPL-CCNC: 16 U/L
ANION GAP SERPL CALC-SCNC: 6 MMOL/L (ref 0–18)
AST SERPL-CCNC: 29 U/L (ref ?–34)
ATRIAL RATE: 64 BPM
BASOPHILS # BLD AUTO: 0.03 X10(3) UL (ref 0–0.2)
BASOPHILS NFR BLD AUTO: 0.6 %
BILIRUB SERPL-MCNC: 0.7 MG/DL (ref 0.2–1.1)
BUN BLD-MCNC: 19 MG/DL (ref 9–23)
CALCIUM BLD-MCNC: 10.1 MG/DL (ref 8.7–10.6)
CHLORIDE SERPL-SCNC: 106 MMOL/L (ref 98–112)
CO2 SERPL-SCNC: 29 MMOL/L (ref 21–32)
CREAT BLD-MCNC: 0.78 MG/DL
D DIMER PPP FEU-MCNC: 0.65 UG/ML FEU (ref ?–0.62)
EGFRCR SERPLBLD CKD-EPI 2021: 86 ML/MIN/1.73M2 (ref 60–?)
EOSINOPHIL # BLD AUTO: 0.06 X10(3) UL (ref 0–0.7)
EOSINOPHIL NFR BLD AUTO: 1.2 %
ERYTHROCYTE [DISTWIDTH] IN BLOOD BY AUTOMATED COUNT: 11.7 %
FLUAV + FLUBV RNA SPEC NAA+PROBE: NEGATIVE
FLUAV + FLUBV RNA SPEC NAA+PROBE: NEGATIVE
GLOBULIN PLAS-MCNC: 2.4 G/DL (ref 2–3.5)
GLUCOSE BLD-MCNC: 101 MG/DL (ref 70–99)
HCT VFR BLD AUTO: 43.3 %
HGB BLD-MCNC: 15.1 G/DL
IMM GRANULOCYTES # BLD AUTO: 0.01 X10(3) UL (ref 0–1)
IMM GRANULOCYTES NFR BLD: 0.2 %
LYMPHOCYTES # BLD AUTO: 1.26 X10(3) UL (ref 1–4)
LYMPHOCYTES NFR BLD AUTO: 25.5 %
MCH RBC QN AUTO: 32.5 PG (ref 26–34)
MCHC RBC AUTO-ENTMCNC: 34.9 G/DL (ref 31–37)
MCV RBC AUTO: 93.1 FL
MONOCYTES # BLD AUTO: 0.5 X10(3) UL (ref 0.1–1)
MONOCYTES NFR BLD AUTO: 10.1 %
NEUTROPHILS # BLD AUTO: 3.08 X10 (3) UL (ref 1.5–7.7)
NEUTROPHILS # BLD AUTO: 3.08 X10(3) UL (ref 1.5–7.7)
NEUTROPHILS NFR BLD AUTO: 62.4 %
OSMOLALITY SERPL CALC.SUM OF ELEC: 294 MOSM/KG (ref 275–295)
P AXIS: 74 DEGREES
P-R INTERVAL: 178 MS
PLATELET # BLD AUTO: 191 10(3)UL (ref 150–450)
POTASSIUM SERPL-SCNC: 4.1 MMOL/L (ref 3.5–5.1)
PROT SERPL-MCNC: 7.3 G/DL (ref 5.7–8.2)
Q-T INTERVAL: 400 MS
QRS DURATION: 84 MS
QTC CALCULATION (BEZET): 412 MS
R AXIS: -4 DEGREES
RBC # BLD AUTO: 4.65 X10(6)UL
RSV RNA SPEC NAA+PROBE: NEGATIVE
SARS-COV-2 RNA RESP QL NAA+PROBE: NOT DETECTED
SODIUM SERPL-SCNC: 141 MMOL/L (ref 136–145)
T AXIS: 79 DEGREES
TROPONIN I SERPL HS-MCNC: <3 NG/L
VENTRICULAR RATE: 64 BPM
WBC # BLD AUTO: 4.9 X10(3) UL (ref 4–11)

## 2025-03-25 PROCEDURE — 84484 ASSAY OF TROPONIN QUANT: CPT | Performed by: EMERGENCY MEDICINE

## 2025-03-25 PROCEDURE — 71260 CT THORAX DX C+: CPT | Performed by: EMERGENCY MEDICINE

## 2025-03-25 PROCEDURE — 80053 COMPREHEN METABOLIC PANEL: CPT | Performed by: EMERGENCY MEDICINE

## 2025-03-25 PROCEDURE — 93005 ELECTROCARDIOGRAM TRACING: CPT

## 2025-03-25 PROCEDURE — 0241U SARS-COV-2/FLU A AND B/RSV BY PCR (GENEXPERT): CPT | Performed by: EMERGENCY MEDICINE

## 2025-03-25 PROCEDURE — 85379 FIBRIN DEGRADATION QUANT: CPT | Performed by: EMERGENCY MEDICINE

## 2025-03-25 PROCEDURE — 99285 EMERGENCY DEPT VISIT HI MDM: CPT

## 2025-03-25 PROCEDURE — 85025 COMPLETE CBC W/AUTO DIFF WBC: CPT | Performed by: EMERGENCY MEDICINE

## 2025-03-25 PROCEDURE — 87430 STREP A AG IA: CPT | Performed by: EMERGENCY MEDICINE

## 2025-03-25 PROCEDURE — 93010 ELECTROCARDIOGRAM REPORT: CPT

## 2025-03-25 PROCEDURE — 71045 X-RAY EXAM CHEST 1 VIEW: CPT | Performed by: EMERGENCY MEDICINE

## 2025-03-25 PROCEDURE — 36415 COLL VENOUS BLD VENIPUNCTURE: CPT

## 2025-03-25 NOTE — TELEPHONE ENCOUNTER
MCM sent to pt stating provider is out of the office but will route message to on call provider.     Dr Li/ on call provider-- see pt message and advise.

## 2025-03-25 NOTE — ED PROVIDER NOTES
Patient Seen in: [facility] Emergency Department      History     Chief Complaint   Patient presents with    Hemoptysis    Chest Pain Angina     Stated Complaint: pt states that she spit blood yesterday, burning sensation in chest    Subjective:   HPI      64-year-old white female complaining of spitting up blood the patient describes she has had sore throat for about a month and some difficulty swallowing pills.  She had coughed up a small amount of blood she described not necessarily coughing but more spitting up with mixed with saliva yesterday about 5 PM and then she had a slight burning sensation in her chest throughout the night.  Does not worsen with movement or changes mild discomfort is been there continuously no nausea vomiting diaphoresis.  No recent cold symptoms.  She is non-smoker she does take Boniva for osteoporosis.  She did not take any medicine for her symptoms she said that her throat she described as looks nasty.  No history of pulmonary embolus DVT or recent travel no history of heart disease she is non-smoker denies excessive alcohol.    Objective:     Past Medical History:    Osteoporosis              Past Surgical History:   Procedure Laterality Date    Breast surgery      Trigger finger release  01/2021    right                 Social History     Socioeconomic History    Marital status:    Tobacco Use    Smoking status: Never    Smokeless tobacco: Never   Vaping Use    Vaping status: Never Used   Substance and Sexual Activity    Alcohol use: No    Drug use: No                  Physical Exam     ED Triage Vitals   BP 03/25/25 0456 120/87   Pulse 03/25/25 0456 66   Resp 03/25/25 0456 19   Temp 03/25/25 0500 97.7 °F (36.5 °C)   Temp src 03/25/25 0500 Temporal   SpO2 03/25/25 0456 100 %   O2 Device 03/25/25 0456 None (Room air)       Current Vitals:   Vital Signs  BP: 117/82  Pulse: 59  Resp: 13  Temp: 97.7 °F (36.5 °C)  Temp src: Temporal  MAP (mmHg): 94    Oxygen Therapy  SpO2:  100 %  O2 Device: None (Room air)        Physical Exam  Patient is alert orient x 3 no acute distress HEENT exam there is oropharynx is mildly erythematous eyes normal tympanic memories normal the neck supple's no Noguchi lymphadenopathy lungs are clear cardiovascular exam shows regular rate and rhythm without murmurs abdomen soft and nontender chest wall there are some mild chest wall tenderness extremities normal skin is no rash    ED Course     Labs Reviewed   COMP METABOLIC PANEL (14) - Abnormal; Notable for the following components:       Result Value    Glucose 101 (*)     Albumin 4.9 (*)     All other components within normal limits   D-DIMER - Abnormal; Notable for the following components:    D-Dimer 0.65 (*)     All other components within normal limits   TROPONIN I HIGH SENSITIVITY - Normal   SARS-COV-2/FLU A AND B/RSV BY PCR (GENEXPERT) - Normal    Narrative:     This test is intended for the qualitative detection and differentiation of SARS-CoV-2, influenza A, influenza B, and respiratory syncytial virus (RSV) viral RNA in nasopharyngeal or nares swabs from individuals suspected of respiratory viral infection consistent with COVID-19 by their healthcare provider. Signs and symptoms of respiratory viral infection due to SARS-CoV-2, influenza, and RSV can be similar.    Test performed using the Xpert Xpress SARS-CoV-2/FLU/RSV (real time RT-PCR)  assay on the GeneXpert instrument, [employer], [place] [address].   This test is being used under the Food and Drug Administration's Emergency Use Authorization.    The authorized Fact Sheet for Healthcare Providers for this assay is available upon request from the laboratory.   RAPID STREP A SCREEN (LC) - Normal    Narrative:     A confirmatory culture is recommended if clinically indicated.   CBC WITH DIFFERENTIAL WITH PLATELET     EKG    Rate, intervals and axes as noted on EKG Report.  Rate: 64  Rhythm: Sinus Rhythm  Reading: Nonspecific ST wave changes she has  history of abnormal EKG              Abnormal Labs Reviewed   COMP METABOLIC PANEL (14) - Abnormal; Notable for the following components:       Result Value    Glucose 101 (*)     Albumin 4.9 (*)     All other components within normal limits   D-DIMER - Abnormal; Notable for the following components:    D-Dimer 0.65 (*)     All other components within normal limits         CT CHEST PE AORTA (IV ONLY) (CPT=71260)    Result Date: 3/25/2025  CONCLUSION:  No evidence of pulmonary embolus.  No suspicious nodule, mass or consolidation.   LOCATION:  [id number]   Dictated by (CST): Cedric Segura MD on 3/25/2025 at 7:02 AM     Finalized by (CST): Cedric Segura MD on 3/25/2025 at 7:05 AM       XR CHEST AP PORTABLE  (CPT=71045)    Result Date: 3/25/2025  PROCEDURE:  XR CHEST AP PORTABLE  (CPT=71045)  TECHNIQUE:  AP chest radiograph was obtained.  COMPARISON:  [facility] , XR, XR CHEST PA + LAT CHEST (CPT=71046), 4/29/2024, 4:42 PM.  INDICATIONS:  pt states that she spit blood yesterday, burning sensation in chest  PATIENT STATED HISTORY: (As transcribed by Technologist)  Patient complains of burning sensation to mid chest along with some slight blood in her saliva.    FINDINGS: Cardiac silhouette and pulmonary vasculature are unremarkable. No consolidation, pleural effusion or pneumothorax. IMPRESSION: Unremarkable portable chest radiograph.  Agree with preliminary radiology report from [facility].    LOCATION:  [id number]      Dictated by (CST): Cedric Sgeura MD on 3/25/2025 at 6:22 AM     Finalized by (CST): Cedric Segura MD on 3/25/2025 at 6:23 AM      Imaging independently reviewed there is no pulmonary embolus.       MDM      Initial differential diagnosis includes ACS pulmonary embolus dissection esophageal reflux pharyngitis        Medical Decision Making  The patient has esophageal reflux she was given omeprazole advised follow-up doctor in a few days return if worse.    Disposition and Plan      Clinical Impression:  1. Gastroesophageal reflux disease, unspecified whether esophagitis present         Disposition:  Discharge  3/25/2025  7:52 am    Follow-up:  Lori Kee MD  [address] [place] [address] [phone]    Follow up in 1 week(s)            Medications Prescribed:  Discharge Medication List as of 3/25/2025  7:54 AM        START taking these medications    Details   Omeprazole 20 MG Oral Tab EC Take 20 mg by mouth daily., Normal, Disp-30 tablet, R-0                 Supplementary Documentation:

## 2025-03-25 NOTE — DISCHARGE INSTRUCTIONS
Take antacids such as Maalox Mylanta or Tums up to 4 times a day.  Avoid large meals before bedtime take the medication as prescribed follow-up with your doctor and/or gastroenterology over the next few days.

## 2025-03-25 NOTE — ED INITIAL ASSESSMENT (HPI)
Pt reports feeling burning sensation to chest since spitting up sputum with blood streaks last noc. Denies blood thinners.

## 2025-03-25 NOTE — TELEPHONE ENCOUNTER
Please see pt mychart message     Per ER note see PCP of GI in a few days, squeeze her in or try another GI, GI she called can't get in until 4/29

## 2025-04-04 ENCOUNTER — TELEPHONE (OUTPATIENT)
Dept: FAMILY MEDICINE CLINIC | Facility: CLINIC | Age: 65
End: 2025-04-04

## 2025-04-04 NOTE — TELEPHONE ENCOUNTER
Please call GI group to see pt sooner/ she is an [employer] employee and please call pt for an update

## 2025-04-04 NOTE — TELEPHONE ENCOUNTER
Spoke with patient she states taking omeprazole from the ER and it has helped her symptoms some.Patient states she is still C/O intermittent difficulty swallowing and intermittent feeling of something in her throat  Patient denies any blood in her saliva.Patient states she is still hoping to be seen sooner than her 4/29/25 appointment with Dr Serrano.  I called Dr Serrano office at #[phone] and left message for Lacy CLEMENS for sooner appointment.Will await callback.

## 2025-04-04 NOTE — TELEPHONE ENCOUNTER
Per Dr Torres: call GI and see if patient can be seen sooner      Spoke with patient she states taking omeprazole from the ER and it has helped her symptoms some.Patient states she is still C/O intermittent difficulty swallowing and intermittent feeling of something in her throat  Patient denies any blood in her saliva.Patient states she is still hoping to be seen sooner than her 4/29/25 appointment with Dr Serrano.  I called Dr Serrano office at #[phone] and left message for Lacy CLEMENS for sooner appointment.Will await callback.

## 2025-04-21 ENCOUNTER — OFFICE VISIT (OUTPATIENT)
Dept: FAMILY MEDICINE CLINIC | Facility: CLINIC | Age: 65
End: 2025-04-21
Payer: COMMERCIAL

## 2025-04-21 VITALS
DIASTOLIC BLOOD PRESSURE: 60 MMHG | RESPIRATION RATE: 16 BRPM | HEART RATE: 66 BPM | SYSTOLIC BLOOD PRESSURE: 102 MMHG | WEIGHT: 135 LBS | HEIGHT: 65 IN | BODY MASS INDEX: 22.49 KG/M2

## 2025-04-21 DIAGNOSIS — D22.9 ATYPICAL NEVI: ICD-10-CM

## 2025-04-21 DIAGNOSIS — Z00.00 ANNUAL PHYSICAL EXAM: Primary | ICD-10-CM

## 2025-04-21 DIAGNOSIS — J31.2 CHRONIC SORE THROAT: ICD-10-CM

## 2025-04-21 DIAGNOSIS — Z12.31 ENCOUNTER FOR SCREENING MAMMOGRAM FOR HIGH-RISK PATIENT: ICD-10-CM

## 2025-04-21 DIAGNOSIS — R13.10 DYSPHAGIA, UNSPECIFIED TYPE: ICD-10-CM

## 2025-04-21 PROCEDURE — 99396 PREV VISIT EST AGE 40-64: CPT | Performed by: FAMILY MEDICINE

## 2025-04-21 RX ORDER — FLUTICASONE PROPIONATE 50 MCG
2 SPRAY, SUSPENSION (ML) NASAL NIGHTLY
Qty: 3 EACH | Refills: 0 | Status: SHIPPED | OUTPATIENT
Start: 2025-04-21

## 2025-04-21 NOTE — PROGRESS NOTES
The following individual(s) verbally consented to be recorded using ambient AI listening technology and understand that they can each withdraw their consent to this listening technology at any point by asking the clinician to turn off or pause the recording:    Patient name: Mary Ann Garcia  Additional names:        HPI:   Mary Ann Garcia is a 65 year old female who presents for a complete physical exam.     Wt Readings from Last 6 Encounters:   04/21/25 135 lb (61.2 kg)   04/11/25 133 lb (60.3 kg)   03/25/25 128 lb (58.1 kg)   12/18/24 137 lb (62.1 kg)   04/17/24 132 lb (59.9 kg)   04/17/24 132 lb (59.9 kg)     Body mass index is 22.47 kg/m².     Cholesterol, Total (mg/dL)   Date Value   05/24/2024 199   04/03/2023 201 (H)   03/21/2022 195     CHOLESTEROL, TOTAL (mg/dL)   Date Value   11/04/2015 220 (H)   04/18/2015 188   08/12/2013 203 (H)     HDL Cholesterol (mg/dL)   Date Value   05/24/2024 95 (H)   04/03/2023 135 (H)   03/21/2022 111 (H)     HDL CHOLESTEROL (mg/dL)   Date Value   11/04/2015 106   04/18/2015 94   08/12/2013 109     LDL Cholesterol (mg/dL)   Date Value   05/24/2024 90   04/03/2023 56   03/21/2022 76     LDL-CHOLESTEROL (mg/dL (calc))   Date Value   11/04/2015 99   04/18/2015 85   08/12/2013 81     AST (U/L)   Date Value   03/25/2025 29   05/24/2024 31   01/04/2024 20   11/04/2015 84 (H)   04/18/2015 34   08/12/2013 32     ALT (U/L)   Date Value   03/25/2025 16   05/24/2024 22   01/04/2024 24   11/04/2015 60 (H)   04/18/2015 23   08/12/2013 19       last pap 2024- HPV negative    all previous paps normal  No vaginal discharge  No bladder dysfunction  No vaginal bleeding  No hot flashes or vaginal dryness   + performing self breast exams  last mammogram - due soon  dexa - 2024 wearing weight vest ; on boniva with Abigail  + calcium and vit D supplementation  family history of breast  Cancer- mom 70's sister - 50's     Colon cancer- paternal uncle  Colonoscopy - 2016-  scheduled    History of Present Illness  Mary Ann Garcia is a 65-year-old female who presents with difficulty swallowing and persistent sore throat.    She has been experiencing difficulty swallowing, initially with pills, which led her to switch to tablets. She now finds that food, especially harder foods like apples, gets stuck, prompting her to switch to softer, mushy foods. She describes a persistent sore throat, particularly in the mornings, and a sensation of something on the left side when swallowing.    Approximately two weeks ago, she spit up saliva with blood, which led her to visit the ER. A CT scan of her chest was performed due to an elevated D-dimer, but no abnormalities were found. She is currently taking Prilosec 40 mg, but she is uncertain if it is helping.    She is concerned about the appearance of her throat, describing it as 'lumpy, bumpy', and notes that it is sore throughout the day. She has not experienced choking episodes. She has a history of taking Boniva and is aware of its potential side effects, including reflux. She follows the instructions for taking Boniva correctly.    She has experienced sleep difficulties in the past, which have improved after discontinuing the use of a headset at night. She is employed, reports doing well with diet and exercise, and [place] in [place] of the [place] with her beagle dog.          Current Outpatient Medications   Medication Sig Dispense Refill    pantoprazole 40 MG Oral Tab EC Take 1 tablet (40 mg total) by mouth every morning before breakfast. 90 tablet 0    Na Sulfate-K Sulfate-Mg Sulf (SUPREP BOWEL PREP KIT) 17.5-3.13-1.6 GM/177ML Oral Solution Take as directed by physician (Patient not taking: Reported on 4/21/2025) 354 mL 0    Ibandronate Sodium 150 MG Oral Tab Take 1 tablet (150 mg total) by mouth every 30 (thirty) days. 3 tablet 1    ferrous sulfate 325 (65 FE) MG Oral Tab EC Take 1 tablet (325 mg total) by mouth daily with breakfast.      NON  FORMULARY [employer] Calcium Bone Strength contains:  D3: 25 mcg  K1: 35 mcg  K2: 45 mcg  Calcium: 905 mg  Magnesium: 62 mg      NON FORMULARY [employer] Multi Collagen Powder containing:   Calcium 53 mg      Cholecalciferol 125 MCG (5000 UT) Oral Tab Take 1 tablet (5,000 Units total) by mouth daily.      Multiple Vitamins-Minerals (MULTI VITAMIN/MINERALS) Oral Tab Take by mouth. Contains 500 mg calcium      magnesium 250 MG Oral Tab Take 1 tablet (250 mg total) by mouth.        Past Medical History:    Allergic rhinitis    Anemia    Arthritis    hands    Chest pain    Heart palpitations    not currently    Hemorrhoids    Osteoporosis    Problems with swallowing    Vomiting blood    blood in saliva      Past Surgical History:   Procedure Laterality Date    Breast surgery      Colonoscopy  2020    Other surgical history  Breast Implants    Tonsillectomy  1967    Trigger finger release  01/2021    right       Family History   Problem Relation Age of Onset    Breast Cancer Mother 77    Diabetes Mother     Hypertension Mother     Cancer Mother         Breast    Dementia Mother     Breast Cancer Sister 55    Ovarian Cancer Paternal Aunt 50        In her 50's.    Breast Cancer Sister     Cancer Sister         Breast    Colon Cancer Paternal Uncle       Social History:   Social History     Socioeconomic History    Marital status:    Tobacco Use    Smoking status: Never    Smokeless tobacco: Never   Vaping Use    Vaping status: Never Used   Substance and Sexual Activity    Alcohol use: No    Drug use: No     Social Drivers of Health     Food Insecurity: No Food Insecurity (4/21/2025)    NCSS - Food Insecurity     Worried About Running Out of Food in the Last Year: No     Ran Out of Food in the Last Year: No   Transportation Needs: No Transportation Needs (4/21/2025)    NCSS - Transportation     Lack of Transportation: No   Housing Stability: Not At Risk (4/21/2025)    NCSS - Housing/Utilities     Has  Housing: Yes     Worried About Losing Housing: No     Unable to Get Utilities: No     Occ: . : .Children: .   MFM for DMG  Exercise:  7 times per week.  Diet: watches calories closely     REVIEW OF SYSTEMS:   GENERAL: feels well otherwise  SKIN: denies any unusual skin lesions  EYES:denies blurred vision or double vision  HEENT: denies nasal congestion, sinus pain or ST  LUNGS: denies shortness of breath with exertion  CARDIOVASCULAR: denies chest pain on exertion  GI: denies abdominal pain,denies heartburn  NEURO: denies headaches  PSYCHE: denies depression or anxiety  HEMATOLOGIC: denies hx of anemia  ENDOCRINE: denies thyroid history  ALL/ASTHMA: denies hx of allergy or asthma    EXAM:   /60   Pulse 66   Resp 16   Ht 5' 5\" (1.651 m)   Wt 135 lb (61.2 kg)   BMI 22.47 kg/m²   Body mass index is 22.47 kg/m².   GENERAL: alert and oriented X 3, well developed, well nourished,in no apparent distress  CARDIO: RRR without murmur  LUNGS: clear to auscultation  NECK: supple,no adenopathy,no thyromegaly  HEENT: atraumatic, normocephalic,ears and throat are clear  EYES:PERRLA, EOMI, normal,conjunctiva are clear  SKIN: norashes,no suspicious lesions  GI: good BS's,no masses, HSM or tenderness  CHEST: no chest tenderness  BREAST: no axillary LAD, no masses no nipple discharge  +breast implants  ((: external genitalia - no inguinal LAD, no lesions. Speculum exam- introitus is normal,scant discharge,cervix is pink)) deferred  Bimanual exam- no adnexal masses or tenderness  RECTAL:good rectal tone,no mass, brown stool, stool is OB negative)) deferred   MUSCULOSKELETAL: back is not tender,FROM of the back  EXTREMITIES: no cyanosis, clubbing or edema  NEURO: cranial nerves are intact,motor and sensory are grossly intact    ASSESSMENT AND PLAN:   Mary Ann Garcia is a 65 year old female who presents with     Assessment & Plan  Dysphagia  Experiences difficulty swallowing, initially with pills and now with  certain foods like apples. Adjusted diet to softer foods. Reports a sore throat, particularly in the morning, and a sensation of something on the left side when swallowing. Spitting up saliva with blood occurred, but CT of the chest and blood work at the ER showed no abnormalities. Scheduled for esophagogastroduodenoscopy (EGD) and colonoscopy to investigate symptoms. Boniva may contribute to reflux symptoms, a known side effect. If EGD suggests medication-induced esophagitis, injectable bisphosphonates may be considered.  - Proceed with scheduled EGD and colonoscopy on 5/6.  - Consider switching from Boniva to injectable bisphosphonates if EGD findings suggest medication-induced esophagitis.    Chronic Sore Throat  Persistent sore throat, worse in the morning and lasting throughout the day. Throat appears vascular and lumpy. Allergies might contribute to the sore throat but should not cause swallowing difficulties. Referral to ENT for further evaluation advised.  - Refer to ENT for further evaluation of chronic sore throat.  - Prescribe Flonase nasal spray to be used as directed before bed.    General Health Maintenance  Up to date with mammograms and blood work. Discussed pneumonia vaccine, recommended starting at age 50. Reports improved sleep after discontinuing headset use at night. Advised to monitor for sleep apnea symptoms. Encouraged regular dermatology visits due to fair skin and freckles.  - Consider pneumonia vaccination, which can be administered at a pharmacy or during a nurse visit.  - Monitor for symptoms of sleep apnea, such as pauses in breathing during sleep.  - Schedule annual dermatology visits for skin checks.           1. Annual physical exam    - Hemoglobin A1C; Future  - Comp Metabolic Panel (14); Future  - Vitamin B12; Future  - CBC With Differential With Platelet; Future  - Lipid Panel; Future  - Assay, Thyroid Stim Hormone; Future  - Free T4, (Free Thyroxine); Future  - Vitamin D [E];  Future    2. Encounter for screening mammogram for high-risk patient    - [facility] KIM 2D+3D SCREENING [facility]([id number]); Future    3. Dysphagia, unspecified type  See gi / will have egd and c-scope soon      Discussed diet, exercise,calcium, vitamin D, fish oil and self breast exams.   Questions answered and patient indicates understanding of these issues and agrees to the plan.  Follow up in one year or sooner if needed .

## 2025-05-06 PROBLEM — R13.10 DYSPHAGIA, UNSPECIFIED: Status: ACTIVE | Noted: 2025-05-06

## 2025-05-06 PROBLEM — D64.9 ANEMIA, UNSPECIFIED: Status: ACTIVE | Noted: 2017-01-23

## 2025-05-06 PROBLEM — Z80.0 FAMILY HISTORY OF COLON CANCER: Status: ACTIVE | Noted: 2025-05-06

## 2025-05-06 PROCEDURE — 88305 TISSUE EXAM BY PATHOLOGIST: CPT | Performed by: STUDENT IN AN ORGANIZED HEALTH CARE EDUCATION/TRAINING PROGRAM

## 2025-05-06 PROCEDURE — 88342 IMHCHEM/IMCYTCHM 1ST ANTB: CPT | Performed by: STUDENT IN AN ORGANIZED HEALTH CARE EDUCATION/TRAINING PROGRAM

## 2025-05-15 ENCOUNTER — LAB ENCOUNTER (OUTPATIENT)
Dept: LAB | Age: 65
End: 2025-05-15
Attending: FAMILY MEDICINE
Payer: COMMERCIAL

## 2025-05-15 DIAGNOSIS — Z00.00 ANNUAL PHYSICAL EXAM: ICD-10-CM

## 2025-05-15 DIAGNOSIS — E55.9 VITAMIN D DEFICIENCY: ICD-10-CM

## 2025-05-15 DIAGNOSIS — Z79.83 LONG-TERM CURRENT USE OF BISPHOSPHONATE: ICD-10-CM

## 2025-05-15 DIAGNOSIS — M81.0 AGE-RELATED OSTEOPOROSIS WITHOUT CURRENT PATHOLOGICAL FRACTURE: ICD-10-CM

## 2025-05-15 LAB
ALBUMIN SERPL-MCNC: 4.7 G/DL (ref 3.2–4.8)
ALBUMIN/GLOB SERPL: 2 {RATIO} (ref 1–2)
ALP LIVER SERPL-CCNC: 88 U/L (ref 50–130)
ALT SERPL-CCNC: 18 U/L (ref 10–49)
ANION GAP SERPL CALC-SCNC: 6 MMOL/L (ref 0–18)
AST SERPL-CCNC: 31 U/L (ref ?–34)
BASOPHILS # BLD AUTO: 0.05 X10(3) UL (ref 0–0.2)
BASOPHILS NFR BLD AUTO: 0.9 %
BILIRUB SERPL-MCNC: 0.6 MG/DL (ref 0.2–1.1)
BUN BLD-MCNC: 19 MG/DL (ref 9–23)
CALCIUM BLD-MCNC: 9.6 MG/DL (ref 8.7–10.6)
CHLORIDE SERPL-SCNC: 104 MMOL/L (ref 98–112)
CHOLEST SERPL-MCNC: 203 MG/DL (ref ?–200)
CO2 SERPL-SCNC: 30 MMOL/L (ref 21–32)
CREAT BLD-MCNC: 0.68 MG/DL (ref 0.55–1.02)
EGFRCR SERPLBLD CKD-EPI 2021: 98 ML/MIN/1.73M2 (ref 60–?)
EOSINOPHIL # BLD AUTO: 0.07 X10(3) UL (ref 0–0.7)
EOSINOPHIL NFR BLD AUTO: 1.2 %
ERYTHROCYTE [DISTWIDTH] IN BLOOD BY AUTOMATED COUNT: 11.6 %
EST. AVERAGE GLUCOSE BLD GHB EST-MCNC: 117 MG/DL (ref 68–126)
FASTING PATIENT LIPID ANSWER: YES
FASTING STATUS PATIENT QL REPORTED: YES
GLOBULIN PLAS-MCNC: 2.4 G/DL (ref 2–3.5)
GLUCOSE BLD-MCNC: 107 MG/DL (ref 70–99)
HBA1C MFR BLD: 5.7 % (ref ?–5.7)
HCT VFR BLD AUTO: 40.8 % (ref 35–48)
HDLC SERPL-MCNC: 93 MG/DL (ref 40–59)
HGB BLD-MCNC: 14.4 G/DL (ref 12–16)
IMM GRANULOCYTES # BLD AUTO: 0.02 X10(3) UL (ref 0–1)
IMM GRANULOCYTES NFR BLD: 0.3 %
LDLC SERPL CALC-MCNC: 100 MG/DL (ref ?–100)
LYMPHOCYTES # BLD AUTO: 1.61 X10(3) UL (ref 1–4)
LYMPHOCYTES NFR BLD AUTO: 27.8 %
MAGNESIUM SERPL-MCNC: 2.1 MG/DL (ref 1.6–2.6)
MCH RBC QN AUTO: 32.7 PG (ref 26–34)
MCHC RBC AUTO-ENTMCNC: 35.3 G/DL (ref 31–37)
MCV RBC AUTO: 92.7 FL (ref 80–100)
MONOCYTES # BLD AUTO: 0.53 X10(3) UL (ref 0.1–1)
MONOCYTES NFR BLD AUTO: 9.2 %
NEUTROPHILS # BLD AUTO: 3.51 X10 (3) UL (ref 1.5–7.7)
NEUTROPHILS # BLD AUTO: 3.51 X10(3) UL (ref 1.5–7.7)
NEUTROPHILS NFR BLD AUTO: 60.6 %
NONHDLC SERPL-MCNC: 110 MG/DL (ref ?–130)
OSMOLALITY SERPL CALC.SUM OF ELEC: 293 MOSM/KG (ref 275–295)
PHOSPHATE SERPL-MCNC: 3.8 MG/DL (ref 2.4–5.1)
PLATELET # BLD AUTO: 199 10(3)UL (ref 150–450)
POTASSIUM SERPL-SCNC: 3.9 MMOL/L (ref 3.5–5.1)
PROT SERPL-MCNC: 7.1 G/DL (ref 5.7–8.2)
RBC # BLD AUTO: 4.4 X10(6)UL (ref 3.8–5.3)
SODIUM SERPL-SCNC: 140 MMOL/L (ref 136–145)
T4 FREE SERPL-MCNC: 1.2 NG/DL (ref 0.8–1.7)
TRIGL SERPL-MCNC: 51 MG/DL (ref 30–149)
TSI SER-ACNC: 1.23 UIU/ML (ref 0.55–4.78)
VIT B12 SERPL-MCNC: 872 PG/ML (ref 211–911)
VIT D+METAB SERPL-MCNC: 79.8 NG/ML (ref 30–100)
VLDLC SERPL CALC-MCNC: 8 MG/DL (ref 0–30)
WBC # BLD AUTO: 5.8 X10(3) UL (ref 4–11)

## 2025-05-15 PROCEDURE — 82607 VITAMIN B-12: CPT

## 2025-05-15 PROCEDURE — 84443 ASSAY THYROID STIM HORMONE: CPT

## 2025-05-15 PROCEDURE — 83036 HEMOGLOBIN GLYCOSYLATED A1C: CPT

## 2025-05-15 PROCEDURE — 80053 COMPREHEN METABOLIC PANEL: CPT

## 2025-05-15 PROCEDURE — 80061 LIPID PANEL: CPT

## 2025-05-15 PROCEDURE — 85025 COMPLETE CBC W/AUTO DIFF WBC: CPT

## 2025-05-15 PROCEDURE — 84439 ASSAY OF FREE THYROXINE: CPT

## 2025-05-15 PROCEDURE — 83735 ASSAY OF MAGNESIUM: CPT

## 2025-05-15 PROCEDURE — 84100 ASSAY OF PHOSPHORUS: CPT

## 2025-05-15 PROCEDURE — 36415 COLL VENOUS BLD VENIPUNCTURE: CPT

## 2025-05-15 PROCEDURE — 82306 VITAMIN D 25 HYDROXY: CPT

## 2025-06-16 DIAGNOSIS — M81.0 AGE-RELATED OSTEOPOROSIS WITHOUT CURRENT PATHOLOGICAL FRACTURE: Primary | ICD-10-CM

## 2025-06-16 RX ORDER — IBANDRONATE SODIUM 150 MG/1
150 TABLET, FILM COATED ORAL
Qty: 3 TABLET | Refills: 1 | Status: SHIPPED | OUTPATIENT
Start: 2025-06-16

## 2025-06-16 NOTE — TELEPHONE ENCOUNTER
Last office visit: 12/18/2024    Next Rheum Apt:12/2/2025 Sarah Li DO    Last fill: Boniva  3/13/2025  qty 3 tablets     Labs:   Lab Results   Component Value Date    CREATSERUM 0.68 05/15/2025    GFR 94 11/16/2016    ALKPHO 88 05/15/2025    AST 31 05/15/2025    ALT 18 05/15/2025    BILT 0.6 05/15/2025    TP 7.1 05/15/2025    ALB 4.7 05/15/2025       Lab Results   Component Value Date    WBC 5.8 05/15/2025    HGB 14.4 05/15/2025    .0 05/15/2025    NEPRELIM 3.51 05/15/2025    NEPERCENT 60.6 05/15/2025    LYPERCENT 27.8 05/15/2025    NE 3.51 05/15/2025    LYMABS 1.61 05/15/2025

## 2025-07-02 ENCOUNTER — LAB ENCOUNTER (OUTPATIENT)
Dept: LAB | Age: 65
End: 2025-07-02
Attending: STUDENT IN AN ORGANIZED HEALTH CARE EDUCATION/TRAINING PROGRAM
Payer: COMMERCIAL

## 2025-07-02 DIAGNOSIS — A04.8 H. PYLORI INFECTION: ICD-10-CM

## 2025-07-02 PROCEDURE — 83013 H PYLORI (C-13) BREATH: CPT

## 2025-07-03 LAB — H PYLORI BREATH TEST: NEGATIVE

## 2025-07-03 NOTE — PROGRESS NOTES
Sahil Reese,    Your breath test study shows NO signs of an infection known as H. pylori.       Please let me know if you have any questions or concerns.     Sincerely,  Joaquin Serrano MD

## 2025-07-15 ENCOUNTER — HOSPITAL ENCOUNTER (OUTPATIENT)
Dept: MAMMOGRAPHY | Facility: HOSPITAL | Age: 65
Discharge: HOME | End: 2025-07-15
Attending: FAMILY MEDICINE
Payer: COMMERCIAL

## 2025-07-15 DIAGNOSIS — Z12.31 ENCOUNTER FOR SCREENING MAMMOGRAM FOR HIGH-RISK PATIENT: ICD-10-CM

## 2025-07-15 PROCEDURE — 77067 SCR MAMMO BI INCL CAD: CPT | Performed by: FAMILY MEDICINE

## 2025-07-15 PROCEDURE — 77063 BREAST TOMOSYNTHESIS BI: CPT | Performed by: FAMILY MEDICINE

## 2025-08-06 ENCOUNTER — PATIENT MESSAGE (OUTPATIENT)
Dept: PODIATRY CLINIC | Facility: CLINIC | Age: 65
End: 2025-08-06

## 2025-08-06 DIAGNOSIS — M20.12 HALLUX VALGUS, LEFT: Primary | ICD-10-CM

## 2025-08-06 DIAGNOSIS — M20.5X2 HALLUX LIMITUS, LEFT: ICD-10-CM

## 2025-08-11 ENCOUNTER — PATIENT MESSAGE (OUTPATIENT)
Dept: FAMILY MEDICINE CLINIC | Facility: CLINIC | Age: 65
End: 2025-08-11

## 2025-08-11 DIAGNOSIS — M21.612 BUNION OF LEFT FOOT: Primary | ICD-10-CM

## (undated) NOTE — Clinical Note
Hi, Dr. Lauren Camilo! Thank you for referring Ms. Lan Rm for rheumatologic evaluation. Please see the discussion portion of my note and let me know if you have any questions.  ELIJAH Stevenson Rheumatology1/8/2020

## (undated) NOTE — MR AVS SNAPSHOT
[address]  [address] [place] [id number] [phone]               Thank you for choosing us for your health care visit with Jacqui Martinez DO.   We are glad to serve you and happy to provide you with this mi XR CERVICAL SPINE (4VIEWS) (CPT=72050)    Complete by:  Jan 23, 2017 (Approximate)    Assoc Dx:  Neck stiffness [M43.6]                 Referral Details     Referred By    Referred To    Johan Copeland DO   [address] [place] [address] [address]   Tabby schedule your appointment. Failure to obtain required authorization numbers can create reimbursement difficulties for you.     Assoc Dx:  Nail abnormality [L60.9]          MyChart     Visit MyChart  You can access your MyChart to more actively manage your h

## (undated) NOTE — LETTER
04/07/21        Irene Kimball  [address] [facility]      Dear Hneri Willams,    [address] [facility] records indicate that you have outstanding lab work and or testing that was ordered for you and has not yet been completed:  Orders Placed This Encoun

## (undated) NOTE — ED AVS SNAPSHOT
[facility] Emergency Department    [facility]  [address] [address]    Phone:  [phone]    Fax:  [phone]   MRN: [id number]    Department:  [facility] Emergency Department   Date of Visit: Where to Get Your Medications      You can get these medications from any pharmacy     Bring a paper prescription for each of these medications    - Amoxicillin-Pot Clavulanate 875-125 MG Tabs              Discharge Instructions       Return to the ER a [facility] Emergency Department. Follow-up care is at the discretion of that Physician. IF THERE IS ANY CHANGE OR WORSENING OF YOUR CONDITION, CALL YOUR PRIMARY CARE PHYSICIAN AT ONCE OR RETURN IMMEDIATELY TO THE EMERGENCY DEPARTMENT.     If you hav harming yourself, contact [facility] at [phone]. - If you don’t have insurance, Adriel Potter has partnered with Patient [address] to help you get signed up for insurance coverage.   Patient [place]

## (undated) NOTE — MR AVS SNAPSHOT
[facility]  [address] [facility]                    After Visit Summary   1/11/2017    Coty Lopez    MRN: [id number]           Visit Information        Provider Department Dept Phone If you've recently had a stay at the Hospital you can access your discharge instructions in [employer] by going to Visits < Admission Summaries.  If you've been to the Emergency Department or your doctor's office, you can view your past visit information in My

## (undated) NOTE — LETTER
04/07/21        Brett Marie  [address]      Dear Nohemi Berman,    [address] records indicate that you have outstanding lab work and or testing that was ordered for you and has not yet been completed:  Orders Placed This Encoun

## (undated) NOTE — ED AVS SNAPSHOT
[facility] Emergency Department    [place]  [address] [address]    Phone:  [phone]    Fax:  [facility] [place] [facility]\""   MRN: [id number]    Department:  [facility] Emergency Department   Date of Visit: IF THERE IS ANY CHANGE OR WORSENING OF YOUR CONDITION, CALL YOUR PRIMARY CARE PHYSICIAN AT ONCE OR RETURN IMMEDIATELY TO THE EMERGENCY DEPARTMENT.     If you have been prescribed any medication(s), please fill your prescription right away and begin taking t

## (undated) NOTE — LETTER
06/19/20        Chriss Citizen  [address]       Dear Geno Allen,    [facility] records indicate that you have outstanding lab work and or testing that was ordered for you and has not yet been completed:  Orders Placed This Encoun

## (undated) NOTE — Clinical Note
Dr. Kayden Baxter! Hope you are doing well and enjoying this warm weather! Thank you for referring Ms. Juliana Conley for rheumatologic evaluation. Please see the discussion portion of my note and let me know if you have any questions.    Bartlett Ganser, DO [facility] 10/5/2023

## (undated) NOTE — MR AVS SNAPSHOT
[facility] [place]  [address] [facility]                    After Visit Summary   1/18/2017    Lala Frederick    MRN: [id number]           Visit Information        Provider Department Dept Phone view more details from this visit by going to [email or web address]. If you've recently had a stay at the Hospital you can access your discharge instructions in [facility] by going to Visits < Admission Summaries.  If you've been to the Emergency Depar